# Patient Record
Sex: MALE | Race: WHITE | Employment: OTHER | URBAN - METROPOLITAN AREA
[De-identification: names, ages, dates, MRNs, and addresses within clinical notes are randomized per-mention and may not be internally consistent; named-entity substitution may affect disease eponyms.]

---

## 2019-08-06 LAB
CREATININE, EXTERNAL: 1.03
LDL-C, EXTERNAL: 134

## 2019-11-08 LAB — HBA1C MFR BLD HPLC: 6.5 %

## 2020-01-02 ENCOUNTER — OFFICE VISIT (OUTPATIENT)
Dept: FAMILY MEDICINE CLINIC | Age: 65
End: 2020-01-02

## 2020-01-02 VITALS
SYSTOLIC BLOOD PRESSURE: 138 MMHG | BODY MASS INDEX: 35.44 KG/M2 | HEIGHT: 76 IN | OXYGEN SATURATION: 97 % | HEART RATE: 94 BPM | RESPIRATION RATE: 16 BRPM | WEIGHT: 291 LBS | TEMPERATURE: 98.7 F | DIASTOLIC BLOOD PRESSURE: 86 MMHG

## 2020-01-02 DIAGNOSIS — E11.319 CONTROLLED TYPE 2 DIABETES MELLITUS WITH RETINOPATHY OF RIGHT EYE, WITH LONG-TERM CURRENT USE OF INSULIN, MACULAR EDEMA PRESENCE UNSPECIFIED, UNSPECIFIED RETINOPATHY SEVERITY (HCC): Primary | ICD-10-CM

## 2020-01-02 DIAGNOSIS — J45.20 MILD INTERMITTENT ASTHMA WITHOUT COMPLICATION: ICD-10-CM

## 2020-01-02 DIAGNOSIS — E66.01 SEVERE OBESITY (HCC): ICD-10-CM

## 2020-01-02 DIAGNOSIS — G31.84 MCI (MILD COGNITIVE IMPAIRMENT): ICD-10-CM

## 2020-01-02 DIAGNOSIS — N40.1 BENIGN PROSTATIC HYPERPLASIA WITH LOWER URINARY TRACT SYMPTOMS, SYMPTOM DETAILS UNSPECIFIED: ICD-10-CM

## 2020-01-02 DIAGNOSIS — N52.9 ERECTILE DYSFUNCTION, UNSPECIFIED ERECTILE DYSFUNCTION TYPE: ICD-10-CM

## 2020-01-02 DIAGNOSIS — E78.5 HYPERLIPIDEMIA, UNSPECIFIED HYPERLIPIDEMIA TYPE: ICD-10-CM

## 2020-01-02 DIAGNOSIS — Z79.4 CONTROLLED TYPE 2 DIABETES MELLITUS WITH RETINOPATHY OF RIGHT EYE, WITH LONG-TERM CURRENT USE OF INSULIN, MACULAR EDEMA PRESENCE UNSPECIFIED, UNSPECIFIED RETINOPATHY SEVERITY (HCC): Primary | ICD-10-CM

## 2020-01-02 DIAGNOSIS — E11.42 CONTROLLED TYPE 2 DIABETES MELLITUS WITH DIABETIC POLYNEUROPATHY, WITH LONG-TERM CURRENT USE OF INSULIN (HCC): ICD-10-CM

## 2020-01-02 DIAGNOSIS — Z79.4 CONTROLLED TYPE 2 DIABETES MELLITUS WITH DIABETIC POLYNEUROPATHY, WITH LONG-TERM CURRENT USE OF INSULIN (HCC): ICD-10-CM

## 2020-01-02 RX ORDER — ATORVASTATIN CALCIUM 10 MG/1
10 TABLET, FILM COATED ORAL DAILY
COMMUNITY
Start: 2019-12-09 | End: 2020-01-29 | Stop reason: DRUGHIGH

## 2020-01-02 RX ORDER — SILDENAFIL 100 MG/1
100 TABLET, FILM COATED ORAL AS NEEDED
COMMUNITY
End: 2020-01-02

## 2020-01-02 RX ORDER — ACYCLOVIR 400 MG/1
400 TABLET ORAL
COMMUNITY
Start: 2019-12-13

## 2020-01-02 RX ORDER — INSULIN ASPART 100 [IU]/ML
30 INJECTION, SOLUTION INTRAVENOUS; SUBCUTANEOUS
COMMUNITY
Start: 2019-12-19 | End: 2020-01-07 | Stop reason: CLARIF

## 2020-01-02 RX ORDER — FENOFIBRATE 145 MG/1
145 TABLET, COATED ORAL DAILY
COMMUNITY
Start: 2019-12-10 | End: 2020-01-07

## 2020-01-02 RX ORDER — TADALAFIL 10 MG/1
10 TABLET ORAL DAILY
COMMUNITY
End: 2020-09-16

## 2020-01-02 RX ORDER — ALBUTEROL SULFATE 90 UG/1
2 AEROSOL, METERED RESPIRATORY (INHALATION)
COMMUNITY
Start: 2019-12-06

## 2020-01-02 RX ORDER — INSULIN GLARGINE 100 [IU]/ML
90 INJECTION, SOLUTION SUBCUTANEOUS DAILY
COMMUNITY
Start: 2019-12-19 | End: 2020-01-07

## 2020-01-02 RX ORDER — TAMSULOSIN HYDROCHLORIDE 0.4 MG/1
0.4 CAPSULE ORAL DAILY
COMMUNITY
Start: 2019-12-09

## 2020-01-02 NOTE — PATIENT INSTRUCTIONS
Preventing Outdoor Falls: Care Instructions  Your Care Instructions    Worries about falls don't need to keep you indoors. Outdoor activities like walking have big benefits for your health. You will need to watch your step and learn a few safety measures. If you are worried about having a fall outdoors, ask your doctor about exercises, classes, or physical therapy that may help. You can learn ways to gain strength, flexibility, and balance. Ask if it might help to use a cane or walker. You can make your time outdoors safer with a few simple measures. Follow-up care is a key part of your treatment and safety. Be sure to make and go to all appointments, and call your doctor if you are having problems. It's also a good idea to know your test results and keep a list of the medicines you take. How can you prevent falls outdoors? · Wear shoes with firm soles and low heels. If you have to walk on an icy surface, use grippers that can be worn over your shoes in bad weather. · Be extra careful if weather is bad. Walk on the grass when the sidewalks are slick. If you live in a place that gets snow and ice in the winter, sprinkle salt on slippery stairs and sidewalks. · Be careful getting on or off buses and trains or getting in and out of cars. If handrails are available, use them. · Be careful when you cross the street. Look for crosswalks or places where curb cuts or ramps are present. · Try not to hurry, especially if you are carrying something. · Be cautious in parking lots or garages. There may be curbs or changes in pavement, or the height of the pavement may vary. · Make sure to wear the correct eyeglasses, if you need them. Reading glasses or bifocals can make it harder to see hazards that might be in your way. · If you are walking outdoors for exercise, try to:  ? Walk in well-lighted, well-maintained areas. These include high school or college tracks, shopping malls, and public spaces.   ? Walk with a partner. ? Watch out for cracked sidewalks, curbs, changes in the height of the pavement, exposed tree roots, and debris such as fallen leaves or branches. Where can you learn more? Go to http://abby-anastasiya.info/. Enter E192 in the search box to learn more about \"Preventing Outdoor Falls: Care Instructions. \"  Current as of: November 7, 2018  Content Version: 12.2  © 0299-1633 [employer]. Care instructions adapted under license by [employer] (which disclaims liability or warranty for this information). If you have questions about a medical condition or this instruction, always ask your healthcare professional. [place] [address] any warranty or liability for your use of this information.

## 2020-01-06 ENCOUNTER — TELEPHONE (OUTPATIENT)
Dept: INTERNAL MEDICINE CLINIC | Age: 65
End: 2020-01-06

## 2020-01-06 NOTE — TELEPHONE ENCOUNTER
Pharmacy Progress Note - Telephone Encounter    S/O: Mr. Rian Child 62 y.o. male, referred by Dr. John Paul Griffin MD, was contacted via an outbound telephone call to discuss his diabetes management today. Verified patients identifiers (name & ) per HIPAA policy. Medication access/SHx:  - Hx of working as an EMT  - Has [employer]. - Hx of receiving care at the [facility]    T2DM:  Endorses 20+ yr hx of T2DM. \"Was off of insulin for about 10 yrs w/ lifestyle modifications\" Resumed insulin therapy 6-7 yrs ago. Reports last A1c (~2019) was 7%. Endorses neuropathy, increased somnolence     Current regimen:  Lantus 90 units daily --- admits he misses this 3-4x/week - \"overslept\"  Novolog/Humalog - 30 units before lunch and dinner -- > sometimes would give 30 units before bedtime \"depending on how high my BG is\"    Monitors fasting and pre-HS SMBGs. Reports fasting readings are usually in 140-150s when he remembers to take Lantus. Otherwise, it would be in the low to mid 200s. Relies on SNAP for meal options. Endorses high carb intake when funds were limited. Wants assistance w/ finding a podiatrist that would be in network w/ his insurance. A/P:  - Pt is scheduled for tomorrow for diabetes management. Provided patient with my office & contact information.  - Recommend patient bring in all medications, previous lab results from [facility], glucometer/log book for review. - Patient endorses understanding to the provided information. All questions were answered at this time.      Thank you for the consult,  Eulalia Duke, PharmD, BCACP, CDE

## 2020-01-07 ENCOUNTER — OFFICE VISIT (OUTPATIENT)
Dept: INTERNAL MEDICINE CLINIC | Age: 65
End: 2020-01-07

## 2020-01-07 VITALS
WEIGHT: 289.4 LBS | HEART RATE: 84 BPM | SYSTOLIC BLOOD PRESSURE: 142 MMHG | BODY MASS INDEX: 35.24 KG/M2 | HEIGHT: 76 IN | OXYGEN SATURATION: 96 % | DIASTOLIC BLOOD PRESSURE: 87 MMHG

## 2020-01-07 DIAGNOSIS — E11.42 CONTROLLED TYPE 2 DIABETES MELLITUS WITH DIABETIC POLYNEUROPATHY, WITH LONG-TERM CURRENT USE OF INSULIN (HCC): Primary | ICD-10-CM

## 2020-01-07 DIAGNOSIS — Z71.89 ENCOUNTER FOR MEDICATION REVIEW AND COUNSELING: ICD-10-CM

## 2020-01-07 DIAGNOSIS — I10 ESSENTIAL HYPERTENSION: ICD-10-CM

## 2020-01-07 DIAGNOSIS — Z79.4 CONTROLLED TYPE 2 DIABETES MELLITUS WITH DIABETIC POLYNEUROPATHY, WITH LONG-TERM CURRENT USE OF INSULIN (HCC): Primary | ICD-10-CM

## 2020-01-07 RX ORDER — ACETAMINOPHEN, DIPHENHYDRAMINE HCL, PHENYLEPHRINE HCL 325; 25; 5 MG/1; MG/1; MG/1
10 TABLET ORAL
COMMUNITY
End: 2020-09-16

## 2020-01-07 RX ORDER — INSULIN LISPRO 100 [IU]/ML
30 INJECTION, SOLUTION INTRAVENOUS; SUBCUTANEOUS
COMMUNITY

## 2020-01-07 RX ORDER — LOSARTAN POTASSIUM 25 MG/1
25 TABLET ORAL DAILY
COMMUNITY

## 2020-01-07 RX ORDER — MONTELUKAST SODIUM 10 MG/1
10 TABLET ORAL DAILY
COMMUNITY

## 2020-01-07 RX ORDER — CYCLOBENZAPRINE HCL 10 MG
10 TABLET ORAL
COMMUNITY

## 2020-01-07 RX ORDER — INSULIN GLARGINE 100 [IU]/ML
45 INJECTION, SOLUTION SUBCUTANEOUS 2 TIMES DAILY
Qty: 30 ML | Refills: 1
Start: 2020-01-07

## 2020-01-07 RX ORDER — LORATADINE 10 MG/1
10 TABLET ORAL DAILY
COMMUNITY

## 2020-01-07 NOTE — PROGRESS NOTES
Pharmacy Progress Note - Diabetes Management    S/O: Mr. Mell Lim is a 62 y.o. male, referred by Dr. Saeid Lind MD, with a PMH of T2DM w/ retinopathy and polyneuropathy, Hx of CVA, CAD, HTN, HLD, BPH, Asthma, PRIYANK, was seen today for diabetes management and medication review. Reports most recent A1c was 7% (~Nov 2019). 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 Prior to that he was uninsured.      Diabetes Management:  Diabetes History:  - Endorses 20+ yr hx of T2DM dx     Current anti-hyperglycemic regimen includes:    - Lantus Solarstar 90 units (administers two 45 unit injections) at bedtime --- admits he misses this dose often  - Humalog 30 units before meals usually BID - TID     - On ASA: NO  - On a moderate/high intensity statin: YES -  lipitor 10 mg  - On ACE/ARB therapy: YES - losartan 25 mg - has been off for about 1 week - \"it's too expensive for me\" ; will  med at [address] today ;   - Nicotine dependence?:  No    ROS:  Today, Pt endorses:  - Symptoms of Hyperglycemia: fatigue and weakness  - Symptoms of Hypoglycemia: none  ---> reports usually symptomatic when BG < 110    Self Monitoring Blood Glucose (SMBG) or CGM:  - Brought inblood glucose log today:  yes     Date Before Breakfast Before Lunch Before Dinner Bedtime Notes   12/31/2019 296   155 Missed Lantus   1/1/2020 207   158    1/2/2020 145   211    1/3/2020 172  102 174 Missed Lantus   1/4/2020  155 171     1/5/2020 153   239    1/6/2020 152  285  Missed Lantus 1/7/2020 195       Avg 189 155 186 187      Nutrition:  - Knows how to read a nutrition label - watches carbohydrate & fiber content   - Reports to eating 2-3 meals daily ; usually brunch and dinner  - Breakfast: usually 2-3 cups of coffee with milk or 2-3 eggs and sausage and 1 slice of toast  - Dinner last night:   - Snacks: - \"would eat an entire sleeve of crackers in one sitting\" - trying to limit my snack craving  - Had 1 ice cream sandwich before bedtime   - Alcohol consumption? Yes - usually socially - had 2 beers on Saturday - understands effect of alcohol on BG    Physical Activity:   - Wants to get back to going to the gym ; motivation is to be on less insulin and meds   - \"I do better when I exercise around other people\"   - Hx of lifting weights     Vitals: Wt Readings from Last 3 Encounters:   01/07/20 289 lb 6.4 oz (131.3 kg)   01/02/20 291 lb (132 kg)     BP Readings from Last 3 Encounters:   01/07/20 142/87   01/02/20 138/86     Pulse Readings from Last 3 Encounters:   01/07/20 84   01/02/20 94       Past Medical History:   Diagnosis Date    Back pain     Diabetes (HCC)     Type 2    DM neuropathy, type II diabetes mellitus (HCC)      Allergies   Allergen Reactions    Sulfa (Sulfonamide Antibiotics) Hives       Current Outpatient Medications   Medication Sig    atorvastatin (LIPITOR) 10 mg tablet Take 10 mg by mouth daily.  fenofibrate nanocrystallized (TRICOR) 145 mg tablet Take 145 mg by mouth daily.  albuterol (PROVENTIL HFA, VENTOLIN HFA, PROAIR HFA) 90 mcg/actuation inhaler     acyclovir (ZOVIRAX) 400 mg tablet Take 400 mg by mouth two (2) times daily as needed.  NOVOLOG FLEXPEN U-100 INSULIN 100 unit/mL (3 mL) inpn INJECT 20 UNITS SUBCUTANEOUSLY WITH 3 MEALS AND 10 UNITS WITH 4 SNACKS DAILY    LANTUS SOLOSTAR U-100 INSULIN 100 unit/mL (3 mL) inpn 90 Units by SubCUTAneous route daily.  tamsulosin (FLOMAX) 0.4 mg capsule Take 0.4 mg by mouth daily.     tadalafil (CIALIS) 10 mg tablet Take 10 mg by mouth daily.  LOSARTAN PO Take  by mouth. No current facility-administered medications for this visit. No labs available at this time. A/P:    Medication Adherence/Access:  - Pt is not adherent to current medication regimen. - See updated med list below. Diabetes Management:  - Still waiting for his external lab results. - BP elevated for goal of < 130/80 today. Recommend patient  losartan as soon as possible. Based on his insurance coverage, this should be free. - Based on provided SMBG, fasting and post prandial readings are elevated for goal < 130 and < 180.    - Discussed concern for his frequent Lantus non-adherence. Recommend distributing administration to 45 units in the morning and 45 units before bedtime  - Continue Humalog 30 units before meals BID-TID.    - Continue to monitor fasting, pre meal and pre-HS SMBG. Bring log to future visits    Nutrition/Lifestyle Modifications:  - Pt has a fairly good understand of lifestyle modifications. - Continue to moderate simple carb intake     Resources provided:  - Thee in network providers for [facility] and podiatry  - [facility] low income membership application - the closest one to his is in [address] and Medication Adherence at the next visit. Medication reconciliation was completed during the visit. Medications Discontinued During This Encounter   Medication Reason    LOSARTAN PO Duplicate Order    fenofibrate nanocrystallized (TRICOR) 145 mg tablet Not A Current Medication    NOVOLOG FLEXPEN U-100 INSULIN 100 unit/mL (3 mL) inpn Formulary Change    LANTUS SOLOSTAR U-100 INSULIN 100 unit/mL (3 mL) inpn        Patient verbalized understanding of the information presented and all of the patients questions were answered. AVS was handed to the patient. Patient advised to call the office with any additional questions or concerns.     Notifications of recommendations will be sent to Dr. Tanna Cooley MD for review. Current updated med list:    Current Outpatient Medications   Medication Sig    cyclobenzaprine (FLEXERIL) 10 mg tablet Take 10 mg by mouth nightly as needed for Muscle Spasm(s). Indications: muscle spasm    loratadine (CLARITIN) 10 mg tablet Take 10 mg by mouth daily.  montelukast (SINGULAIR) 10 mg tablet Take 10 mg by mouth daily.  melatonin 10 mg tab Take 10 mg by mouth nightly.  insulin lispro (HUMALOG U-100 INSULIN) 100 unit/mL injection 30 Units by SubCUTAneous route Before breakfast, lunch, and dinner. Indications: type 2 diabetes mellitus    LANTUS SOLOSTAR U-100 INSULIN 100 unit/mL (3 mL) inpn 45 Units by SubCUTAneous route two (2) times a day.  atorvastatin (LIPITOR) 10 mg tablet Take 10 mg by mouth daily.  acyclovir (ZOVIRAX) 400 mg tablet Take 400 mg by mouth two (2) times daily as needed.  tamsulosin (FLOMAX) 0.4 mg capsule Take 0.4 mg by mouth daily.  tadalafil (CIALIS) 10 mg tablet Take 10 mg by mouth daily.  losartan (COZAAR) 25 mg tablet Take 25 mg by mouth daily.  albuterol (PROVENTIL HFA, VENTOLIN HFA, PROAIR HFA) 90 mcg/actuation inhaler Take 2 Puffs by inhalation every six (6) hours as needed for Shortness of Breath. No current facility-administered medications for this visit. Patient will return to clinic in 3 week(s) for follow up.      Thank you for the consult,  Eulalia Marie, PharmD, BCACP, CDE

## 2020-01-07 NOTE — PATIENT INSTRUCTIONS
· Divide Lantus dose to 45 units in the morning and 45 units before bedtime. · Continue Humalog 30 units before each meal two to three times daily. · Bring in all of your medication vials. Your blood sugar goals:  - Fasting (first thing in the morning)  blood sugar: 80 - 130   - 1 to 2 hours after a meal: less than 180   Bring your log book to future visits. When you experience symptoms of low blood sugar (example: less than 70):  - Confirm low reading by checking your blood sugar.   - Then treat with 15 grams of carbohydrates (one-half cup of juice or regular soda, or 4-5 glucose tablets).   - Wait 15 minutes to recheck blood sugar.   - Then eat a protein containing meal/snack to prevent another low blood sugar episode. (example: peanut butter + crackers)    CA: [address]

## 2020-01-29 ENCOUNTER — OFFICE VISIT (OUTPATIENT)
Dept: INTERNAL MEDICINE CLINIC | Age: 65
End: 2020-01-29

## 2020-01-29 VITALS
WEIGHT: 288 LBS | SYSTOLIC BLOOD PRESSURE: 144 MMHG | HEART RATE: 99 BPM | DIASTOLIC BLOOD PRESSURE: 92 MMHG | BODY MASS INDEX: 35.07 KG/M2 | HEIGHT: 76 IN | OXYGEN SATURATION: 97 %

## 2020-01-29 DIAGNOSIS — E11.42 CONTROLLED TYPE 2 DIABETES MELLITUS WITH DIABETIC POLYNEUROPATHY, WITH LONG-TERM CURRENT USE OF INSULIN (HCC): Primary | ICD-10-CM

## 2020-01-29 DIAGNOSIS — Z71.89 ENCOUNTER FOR MEDICATION REVIEW AND COUNSELING: ICD-10-CM

## 2020-01-29 DIAGNOSIS — Z79.4 CONTROLLED TYPE 2 DIABETES MELLITUS WITH DIABETIC POLYNEUROPATHY, WITH LONG-TERM CURRENT USE OF INSULIN (HCC): Primary | ICD-10-CM

## 2020-01-29 RX ORDER — ATORVASTATIN CALCIUM 40 MG/1
40 TABLET, FILM COATED ORAL DAILY
COMMUNITY
Start: 2020-01-27

## 2020-01-29 RX ORDER — OMEPRAZOLE 20 MG/1
20 CAPSULE, DELAYED RELEASE ORAL DAILY
COMMUNITY

## 2020-01-29 RX ORDER — FENOFIBRATE 145 MG/1
145 TABLET, COATED ORAL DAILY
COMMUNITY
Start: 2020-01-27 | End: 2020-09-16

## 2020-01-29 RX ORDER — ASPIRIN 81 MG/1
81 TABLET ORAL DAILY
COMMUNITY

## 2020-01-29 NOTE — PATIENT INSTRUCTIONS
· Make sure you are taking Lantus 45 units twice daily. · Continue with Humalog 30 units three times a day before your meals. If blood sugar pre-meal is above 200, give 34 units. · Bring any lab results you have to your next appointments.

## 2020-01-29 NOTE — PROGRESS NOTES
Pharmacy Progress Note - Diabetes Management S/O: Mr. Mell Lim is a 62 y.o. male, referred by Dr. Saeid Lind MD, with a PMH of T2DM w/ retinopathy and polyneuropathy, Hx of CVA, CAD, HTN, HLD, BPH, Asthma, PRIYANK, was seen today for diabetes management and medication review. Reports most recent A1c was 6.5% (11/8/19). Interim update: *** SHx: 
- Medication Adherence/Access: 
- Brought in home medications including prescription/non-prescriptions:  {yes no:03734} - Endorses barriers to affording/accessing medications : {yes no:58084} - Uses a pill box/other methods to organize medications: {yes no:94386} - Endorses adherence to current regimen?: {yes no:27029} - Uses *** pharmacy ; Rx insurance is: *** Diabetes Management: 
Diabetes History: 
- Endorses - Family hx:  
- Previous anti-hyperglycemic agents includes: 
 
Current anti-hyperglycemic regimen includes:   
- Lantus Solostar 45 units every morning and 45 units at bedtime - Humalog 30 units before meals BID - TID 
 
- On ASA: NO 
- On a moderate/high intensity statin: YES - Lipitor 10 mg 
- On ACE/ARB therapy: YES - losartan 25 mg  
- Nicotine dependence?:  No 
 
ROS: 
Today, Pt endorses: 
- {Symptoms of Hyperglycemia:68047:::1} 
- {Symptoms of Hypoglycemia:81111:::1} Self Monitoring Blood Glucose (SMBG) or CGM: 
- Brought in home glucometer/blood glucose log/CGM reader today:  {yes no:07711} Date Before Breakfast Before Lunch Before Dinner Bedtime Notes 1/8/2020        
1/9/2020        
1/10/2020        
1/11/2020        
1/12/2020       
1/13/2020       
1/14/2020       
1/15/2020        
1/16/2020       
1/17/2020       
1/18/2020       
1/19/2020       
1/20/2020       
1/21/2020       
1/22/2020       
1/23/2020       
1/24/2020       
1/25/2020       
1/26/2020       
1/27/2020       
1/28/2020       
1/29/2020 Avg Nutrition: 
- Eats {Numbers; 1-4 :70433397} meals/day - Breakfast: 
- Lunch: 
 - Dinner: - Snack(s):  
- Beverage(s): 
- Alcohol consumption? {YES (DEF) - NO:50992::\"Yes\"} Physical Activity:  
{yes no:99957} 
- Consists of  
 
 
Vitals: Wt Readings from Last 3 Encounters:  
01/07/20 289 lb 6.4 oz (131.3 kg) 01/02/20 291 lb (132 kg) BP Readings from Last 3 Encounters:  
01/07/20 142/87  
01/02/20 138/86 Pulse Readings from Last 3 Encounters:  
01/07/20 84  
01/02/20 94 Past Medical History:  
Diagnosis Date  Back pain  Diabetes ([facility] Type 2  
 DM neuropathy, type II diabetes mellitus ([facility] [address] Allergies Allergen Reactions  Sulfa (Sulfonamide Antibiotics) Hives Current Outpatient Medications Medication Sig  
 losartan (COZAAR) 25 mg tablet Take 25 mg by mouth daily.  cyclobenzaprine (FLEXERIL) 10 mg tablet Take 10 mg by mouth nightly as needed for Muscle Spasm(s). Indications: muscle spasm  loratadine (CLARITIN) 10 mg tablet Take 10 mg by mouth daily.  montelukast (SINGULAIR) 10 mg tablet Take 10 mg by mouth daily.  melatonin 10 mg tab Take 10 mg by mouth nightly.  insulin lispro (HUMALOG U-100 INSULIN) 100 unit/mL injection 30 Units by SubCUTAneous route Before breakfast, lunch, and dinner. Indications: type 2 diabetes mellitus  LANTUS SOLOSTAR U-100 INSULIN 100 unit/mL (3 mL) inpn 45 Units by SubCUTAneous route two (2) times a day.  atorvastatin (LIPITOR) 10 mg tablet Take 10 mg by mouth daily.  albuterol (PROVENTIL HFA, VENTOLIN HFA, PROAIR HFA) 90 mcg/actuation inhaler Take 2 Puffs by inhalation every six (6) hours as needed for Shortness of Breath.  acyclovir (ZOVIRAX) 400 mg tablet Take 400 mg by mouth two (2) times daily as needed.  tamsulosin (FLOMAX) 0.4 mg capsule Take 0.4 mg by mouth daily.  tadalafil (CIALIS) 10 mg tablet Take 10 mg by mouth daily. No current facility-administered medications for this visit.    
 
 
No results found for: NA, K, CL, CO2, AGAP, GLU, BUN, CREA, BUCR, GFRAA, GFRNA, CA, TBIL, TBILI, GPT, SGOT, AP, TP, ALB, GLOB, AGRAT, ALT No results found for: CHOL, CHOLPOCT, CHOLX, CHLST, CHOLV, HDL, HDLPOC, HDLP, LDL, LDLCPOC, LDLC, DLDLP, VLDLC, VLDL, TGLX, TRIGL, TRIGP, TGLPOCT, CHHD, CHHDX No results found for: WBC, WBCLT, HGBPOC, HGB, HGBP, HCTPOC, HCT, PHCT, RBCH, PLT, MCV, HGBEXT, HCTEXT, PLTEXT No results found for: Cleta Held, MCA2, [address], MCAU, [address], MCALPOCT HbA1c: 
Lab Results Component Value Date/Time Hemoglobin A1c, External 6.5 11/08/2019 No components found for: 2 Last Point of Care HGB A1C No results found for: LPF2CQLZ CrCl cannot be calculated (No successful lab value found. ). A/P: 
 
Medication Adherence/Access: 
- Pt {IS/IS NOT:69395} adherent to current medication regimen. Diabetes Management: - Per ADA guidelines, Pt's A1c {IS/IS NOT:63402} at goal of < ***. 
- Current SMBG(s)/CGM trend - Recommend checking/scanning - Reviewed and provided resource discussing s/sx of hypoglycemia and management - Bring glucometer/log/ CGM reader to all future visits Established patient-centered goal(s) to follow up on at the next visit:   
 
Resources provided: 
- Check: {Plan-TT:44021} at the next visit. Medication reconciliation was completed during the visit. There are no discontinued medications. Patient verbalized understanding of the information presented and all of the patients questions were answered. AVS was handed to the patient. Patient advised to call the office with any additional questions or concerns. Notifications of recommendations will be sent to Dr. Triny White MD for review. Patient will return to clinic in {numbers 1-12:83278} week(s) for follow up. Thank you for the consult, London Sanchez, PharmD,

## 2020-01-29 NOTE — PROGRESS NOTES
Pharmacy Progress Note - Diabetes Management    S/O: Mr. Francisco Javier Quevedo is a 62 y.o. male, referred by Dr. Elham Benavides MD, with a PMH of T2DM w/ retinopathy and polyneuropathy, Hx of CVA, CAD, HTN, HLD, BPH, Asthma, PRIYANK, was seen today for diabetes management and medication review. External records shows last A1c was 6.5% (2019). Interim update: \"Feeling better\"   - Saw an Renetta Hall NP at the [facility] on 20 for routine follow up. Had repeat A1c, CMP, and lipid panel collected that day. No results yet. Visit summary report - med list show fenofibrate 145 mg daily, atorvastatin increased from 10 mg to 40 mg daily, ASA 81 mg daily, and omeprazole 20 mg daily.   - Reports he will be seeing a vascular surgeon at [facility] tomorrow to evaluate his foot pain. Current anti-hyperglycemic regimen include(s):    - Lantus Solarstar 45 units in the AM and 45 units HS  ---> admits to missing AM dose of Lantus - he's in the midst of moving to [facility]. Within the past 2 weeks, he's only given Lantus 8 times - all PM dose.    - Humalog 30 units BID-TID before meals     - On ASA: NO  - On a moderate/high intensity statin: YES - Lipitor; last HDL: 42 ; LDL: 134; T (Aug 2019)  - On ACE/ARB therapy: YES - losartan 25 mg   - Nicotine dependence?:  No    ROS:  Today, Pt endorses:  - Symptoms of Hyperglycemia: none  - Symptoms of Hypoglycemia: none    Self Monitoring Blood Glucose (SMBG) or CGM:  - Brought in home glucometer/blood glucose log today:  yes  Before Breakfast Before Lunch Before Dinner Bedtime   216 275 191    202 179 189    184 148 231     128 111    209 108     199   231   200 148  148   243 161  200   198       225  269    281     218      227         Nutrition/Lifestyle Modifications:  - B: 3 eggs and sausage, coffee, with stevia  - D: salad & cheese and moncada bits & blue cheese dressing  - Continues to snack on cheese and crackers throughout the day    - Physical Activity:   [employer] plan remains pending  Still very sedentary       Vitals: Wt Readings from Last 3 Encounters:   01/29/20 288 lb (130.6 kg)   01/07/20 289 lb 6.4 oz (131.3 kg)   01/02/20 291 lb (132 kg)     BP Readings from Last 3 Encounters:   01/29/20 (!) 144/92   01/07/20 142/87   01/02/20 138/86     Pulse Readings from Last 3 Encounters:   01/29/20 99   01/07/20 84   01/02/20 94       Past Medical History:   Diagnosis Date    Back pain     Diabetes (HCC)     Type 2    DM neuropathy, type II diabetes mellitus (HCC)      Allergies   Allergen Reactions    Sulfa (Sulfonamide Antibiotics) Hives       Current Outpatient Medications   Medication Sig    losartan (COZAAR) 25 mg tablet Take 25 mg by mouth daily.  cyclobenzaprine (FLEXERIL) 10 mg tablet Take 10 mg by mouth nightly as needed for Muscle Spasm(s). Indications: muscle spasm    loratadine (CLARITIN) 10 mg tablet Take 10 mg by mouth daily.  montelukast (SINGULAIR) 10 mg tablet Take 10 mg by mouth daily.  melatonin 10 mg tab Take 10 mg by mouth nightly.  insulin lispro (HUMALOG U-100 INSULIN) 100 unit/mL injection 30 Units by SubCUTAneous route Before breakfast, lunch, and dinner. Indications: type 2 diabetes mellitus    LANTUS SOLOSTAR U-100 INSULIN 100 unit/mL (3 mL) inpn 45 Units by SubCUTAneous route two (2) times a day.  atorvastatin (LIPITOR) 10 mg tablet Take 10 mg by mouth daily.  albuterol (PROVENTIL HFA, VENTOLIN HFA, PROAIR HFA) 90 mcg/actuation inhaler Take 2 Puffs by inhalation every six (6) hours as needed for Shortness of Breath.  acyclovir (ZOVIRAX) 400 mg tablet Take 400 mg by mouth two (2) times daily as needed.  tamsulosin (FLOMAX) 0.4 mg capsule Take 0.4 mg by mouth daily.  tadalafil (CIALIS) 10 mg tablet Take 10 mg by mouth daily. No current facility-administered medications for this visit.         HbA1c:  Lab Results   Component Value Date/Time    Hemoglobin A1c, External 6.5 11/08/2019       A/P:    Diabetes Management:  - Per ADA guidelines, Pt's A1c is at goal of < 7%. BP elevated today. - Current SMBGs remain elevated for fasting and post prandial goals secondary to his med non-compliance. - Emphasized importance of moderating simple carb intake again.   - Continue Lantus 45 units twice daily  - Continue Novolog 30 units TID. If pre-meal blood sugar > 200, give 34 units. Check: Vitals, Weight and Medication Adherence at the next visit. Medication reconciliation was completed during the visit. Medications Discontinued During This Encounter   Medication Reason    atorvastatin (LIPITOR) 10 mg tablet Dose Adjustment     Patient verbalized understanding of the information presented and all of the patients questions were answered. AVS was handed to the patient. Patient advised to call the office with any additional questions or concerns. Notifications of recommendations will be sent to Dr. Favio Del Rosario MD for review. Patient will return to clinic in 2 week(s) for follow up.      Thank you for the consult,  Eulalia Carranza, PharmD, BCACP, CDE

## 2020-02-11 ENCOUNTER — TELEPHONE (OUTPATIENT)
Dept: FAMILY MEDICINE CLINIC | Age: 65
End: 2020-02-11

## 2020-02-11 NOTE — TELEPHONE ENCOUNTER
----- Message from Marek Flaherty sent at 2/11/2020  2:05 PM EST -----  Regarding: Dr. Paulo Wing  Pt request for a referral for a C-Pap machine to be sent to his [place] at [phone]. Best contact number is [phone].

## 2020-03-11 ENCOUNTER — TELEPHONE (OUTPATIENT)
Dept: NEUROLOGY | Age: 65
End: 2020-03-11

## 2020-03-11 NOTE — TELEPHONE ENCOUNTER
Spoke to patient and told him what is needed, per patient will followup with a letter to his home with the same info.

## 2020-03-11 NOTE — TELEPHONE ENCOUNTER
----- Message from Joni Grey sent at 3/11/2020 12:51 PM EDT -----  Regarding: Dr. Patricia Arias  Pt is sick and would like a call back to r/s his appt.  Contact is [phone]

## 2020-03-12 NOTE — TELEPHONE ENCOUNTER
Called pt back and he said that he would call and schedule later after Covid dies down more. Gave him my direct # for call back.

## 2020-09-16 ENCOUNTER — VIRTUAL VISIT (OUTPATIENT)
Dept: FAMILY MEDICINE CLINIC | Age: 65
End: 2020-09-16
Payer: COMMERCIAL

## 2020-09-16 ENCOUNTER — TELEPHONE (OUTPATIENT)
Dept: FAMILY MEDICINE CLINIC | Age: 65
End: 2020-09-16

## 2020-09-16 DIAGNOSIS — I25.2 HISTORY OF MI (MYOCARDIAL INFARCTION): ICD-10-CM

## 2020-09-16 DIAGNOSIS — E11.42 CONTROLLED TYPE 2 DIABETES MELLITUS WITH DIABETIC POLYNEUROPATHY, WITH LONG-TERM CURRENT USE OF INSULIN (HCC): ICD-10-CM

## 2020-09-16 DIAGNOSIS — E66.01 SEVERE OBESITY (HCC): ICD-10-CM

## 2020-09-16 DIAGNOSIS — I10 ESSENTIAL HYPERTENSION: ICD-10-CM

## 2020-09-16 DIAGNOSIS — E78.5 HYPERLIPIDEMIA, UNSPECIFIED HYPERLIPIDEMIA TYPE: ICD-10-CM

## 2020-09-16 DIAGNOSIS — Z12.5 PROSTATE CANCER SCREENING: ICD-10-CM

## 2020-09-16 DIAGNOSIS — Z79.4 CONTROLLED TYPE 2 DIABETES MELLITUS WITH DIABETIC POLYNEUROPATHY, WITH LONG-TERM CURRENT USE OF INSULIN (HCC): ICD-10-CM

## 2020-09-16 DIAGNOSIS — G47.33 OSA ON CPAP: Primary | ICD-10-CM

## 2020-09-16 DIAGNOSIS — Z99.89 OSA ON CPAP: Primary | ICD-10-CM

## 2020-09-16 DIAGNOSIS — Z86.73 HISTORY OF CVA (CEREBROVASCULAR ACCIDENT): ICD-10-CM

## 2020-09-16 PROCEDURE — 99443 PR PHYS/QHP TELEPHONE EVALUATION 21-30 MIN: CPT | Performed by: FAMILY MEDICINE

## 2020-09-16 RX ORDER — EPINEPHRINE 0.3 MG/.3ML
INJECTION SUBCUTANEOUS
COMMUNITY
Start: 2020-09-10

## 2020-09-16 RX ORDER — INSULIN ASPART 100 [IU]/ML
INJECTION, SOLUTION INTRAVENOUS; SUBCUTANEOUS
COMMUNITY
Start: 2020-06-17

## 2020-09-16 RX ORDER — SILDENAFIL 100 MG/1
100 TABLET, FILM COATED ORAL AS NEEDED
COMMUNITY

## 2020-09-16 RX ORDER — HYDROCORTISONE 25 MG/G
OINTMENT TOPICAL
COMMUNITY
Start: 2020-07-29

## 2020-09-16 NOTE — PROGRESS NOTES
Skye De La Paz is a 60 y.o. male, evaluated via audio-only technology on 9/16/2020 for CPAP  . Assessment & Plan:   Diagnoses and all orders for this visit:    1. PRIYANK on CPAP  -     AMB SUPPLY ORDER    2. Controlled type 2 diabetes mellitus with diabetic polyneuropathy, with long-term current use of insulin (HCC)  -     URINALYSIS W/ RFLX MICROSCOPIC; Future  -     TSH 3RD GENERATION; Future  -     METABOLIC PANEL, COMPREHENSIVE; Future  -     LIPID PANEL; Future  -     HEMOGLOBIN A1C WITH EAG; Future  -     MICROALBUMIN, UR, RAND W/ MICROALB/CREAT RATIO; Future    3. Severe obesity ([facility] [address]  -     TSH 3RD GENERATION; Future  -     METABOLIC PANEL, COMPREHENSIVE; Future  -     LIPID PANEL; Future    4. Hyperlipidemia, unspecified hyperlipidemia type  -     LIPID PANEL; Future    5. Essential hypertension  -     CBC WITH AUTOMATED DIFF; Future  -     URINALYSIS W/ RFLX MICROSCOPIC; Future  -     TSH 3RD GENERATION; Future  -     METABOLIC PANEL, COMPREHENSIVE; Future  -     LIPID PANEL; Future    6. Prostate cancer screening  -     PSA, DIAGNOSTIC (PROSTATE SPECIFIC AG); Future    7. History of MI (myocardial infarction)    8. History of CVA (cerebrovascular accident)      The complexity of medical decision making for this visit is moderate         12  Subjective: On CPAP for long time. Needs back up machine as previous machine failed Feb 2019 and had stroke and MI. Not yet seen Dr. Sean Echols for memory issues. Moved to [place]. Recent poison ivy outbreak. Not yet made appt with endo, urol. Feels ringing in head. Different than usual tinnitus. Fatigued. Insomnia. Prior to Admission medications    Medication Sig Start Date End Date Taking? Authorizing Provider   fenofibrate nanocrystallized (TRICOR) 145 mg tablet Take 145 mg by mouth daily. 1/27/20   Provider, Historical   atorvastatin (LIPITOR) 40 mg tablet Take 40 mg by mouth daily.  1/27/20   Provider, Historical   aspirin delayed-release 81 mg tablet Take 81 mg by mouth daily. Provider, Historical   omeprazole (PRILOSEC) 20 mg capsule Take 20 mg by mouth daily. Provider, Historical   losartan (COZAAR) 25 mg tablet Take 25 mg by mouth daily. Provider, Historical   cyclobenzaprine (FLEXERIL) 10 mg tablet Take 10 mg by mouth nightly as needed for Muscle Spasm(s). Indications: muscle spasm    Provider, Historical   loratadine (CLARITIN) 10 mg tablet Take 10 mg by mouth daily. Provider, Historical   montelukast (SINGULAIR) 10 mg tablet Take 10 mg by mouth daily. Provider, Historical   melatonin 10 mg tab Take 10 mg by mouth nightly. Provider, Historical   insulin lispro (HUMALOG U-100 INSULIN) 100 unit/mL injection 30 Units by SubCUTAneous route Before breakfast, lunch, and dinner. Indications: type 2 diabetes mellitus    Provider, Historical   LANTUS SOLOSTAR U-100 INSULIN 100 unit/mL (3 mL) inpn 45 Units by SubCUTAneous route two (2) times a day. 1/7/20   Virgil Sims MD   albuterol (PROVENTIL HFA, VENTOLIN HFA, PROAIR HFA) 90 mcg/actuation inhaler Take 2 Puffs by inhalation every six (6) hours as needed for Shortness of Breath. 12/6/19   Provider, Historical   acyclovir (ZOVIRAX) 400 mg tablet Take 400 mg by mouth two (2) times daily as needed. 12/13/19   Provider, Historical   tamsulosin (FLOMAX) 0.4 mg capsule Take 0.4 mg by mouth daily. 12/9/19   Provider, Historical   tadalafil (CIALIS) 10 mg tablet Take 10 mg by mouth daily.     Provider, Historical     Patient Active Problem List   Diagnosis Code    Severe obesity ([facility] [address] E66.01    Hyperlipidemia E78.5    Erectile dysfunction N52.9    Benign prostatic hyperplasia with lower urinary tract symptoms N40.1    Mild intermittent asthma without complication A29.97    Controlled type 2 diabetes mellitus with diabetic polyneuropathy, with long-term current use of insulin ([facility]) E11.42, Z79.4    Controlled type 2 diabetes mellitus with retinopathy of right eye, with long-term current use of insulin ([facility]) E11.319, Z79.4     Current Outpatient Medications   Medication Sig Dispense Refill    fenofibrate nanocrystallized (TRICOR) 145 mg tablet Take 145 mg by mouth daily.  atorvastatin (LIPITOR) 40 mg tablet Take 40 mg by mouth daily.  aspirin delayed-release 81 mg tablet Take 81 mg by mouth daily.  omeprazole (PRILOSEC) 20 mg capsule Take 20 mg by mouth daily.  losartan (COZAAR) 25 mg tablet Take 25 mg by mouth daily.  cyclobenzaprine (FLEXERIL) 10 mg tablet Take 10 mg by mouth nightly as needed for Muscle Spasm(s). Indications: muscle spasm      loratadine (CLARITIN) 10 mg tablet Take 10 mg by mouth daily.  montelukast (SINGULAIR) 10 mg tablet Take 10 mg by mouth daily.  melatonin 10 mg tab Take 10 mg by mouth nightly.  insulin lispro (HUMALOG U-100 INSULIN) 100 unit/mL injection 30 Units by SubCUTAneous route Before breakfast, lunch, and dinner. Indications: type 2 diabetes mellitus      LANTUS SOLOSTAR U-100 INSULIN 100 unit/mL (3 mL) inpn 45 Units by SubCUTAneous route two (2) times a day. 30 mL 1    albuterol (PROVENTIL HFA, VENTOLIN HFA, PROAIR HFA) 90 mcg/actuation inhaler Take 2 Puffs by inhalation every six (6) hours as needed for Shortness of Breath.  acyclovir (ZOVIRAX) 400 mg tablet Take 400 mg by mouth two (2) times daily as needed.  tamsulosin (FLOMAX) 0.4 mg capsule Take 0.4 mg by mouth daily.  tadalafil (CIALIS) 10 mg tablet Take 10 mg by mouth daily. Allergies   Allergen Reactions    Sulfa (Sulfonamide Antibiotics) Hives     Past Medical History:   Diagnosis Date    Back pain     Diabetes ([address]     Type 2    DM neuropathy, type II diabetes mellitus ([facility] [address]      No past surgical history on file.   Family History   Problem Relation Age of Onset    No Known Problems Mother     No Known Problems Father      Social History     Tobacco Use    Smoking status: Never Smoker    Smokeless tobacco: Never Used   Substance Use Topics  Alcohol use: Never     Frequency: Never       ROS    No flowsheet data found. Rafael Shannon, who was evaluated through a patient-initiated, synchronous (real-time) audio only encounter, and/or her healthcare decision maker, is aware that it is a billable service, with coverage as determined by his insurance carrier. He provided verbal consent to proceed: Yes. He has not had a related appointment within my department in the past 7 days or scheduled within the next 24 hours.       Total Time: minutes: 21-30 minutes    Kiet Stoner MD

## 2020-09-16 NOTE — TELEPHONE ENCOUNTER
Patient called in stating that he had a virtual visit with Dr. Mj Fletcher earlier this am and was requesting a CPAP machine. He's able to get one through his insurance with [address] On the prescription they would need to know how many cm of water. He also stated that he needs to complete kit that comes with it including the mask ( previous mask that he had is a [employer] Air sense 10. He also stated that they would need a sleep study report, he mentioned he haven't had one done in over 15, 20 years and believed to have had it done on [facility]. Writer attempted to reach the office several times with no success. He doesn't want to do another sleep study because he already done so and had the surgery.      [facility] Fax: [phone] ( for any assistance) : [phone] option 2    Sleep study office on [address]: [phone]

## 2020-09-22 ENCOUNTER — VIRTUAL VISIT (OUTPATIENT)
Dept: FAMILY MEDICINE CLINIC | Age: 65
End: 2020-09-22
Payer: COMMERCIAL

## 2020-09-22 ENCOUNTER — TELEPHONE (OUTPATIENT)
Dept: FAMILY MEDICINE CLINIC | Age: 65
End: 2020-09-22

## 2020-09-22 DIAGNOSIS — S06.0X0A CONCUSSION WITHOUT LOSS OF CONSCIOUSNESS, INITIAL ENCOUNTER: ICD-10-CM

## 2020-09-22 DIAGNOSIS — Z99.89 OSA ON CPAP: ICD-10-CM

## 2020-09-22 DIAGNOSIS — I10 ESSENTIAL HYPERTENSION: ICD-10-CM

## 2020-09-22 DIAGNOSIS — F51.04 CHRONIC INSOMNIA: ICD-10-CM

## 2020-09-22 DIAGNOSIS — E08.49 OTHER DIABETIC NEUROLOGICAL COMPLICATION ASSOCIATED WITH DIABETES MELLITUS DUE TO UNDERLYING CONDITION (HCC): ICD-10-CM

## 2020-09-22 DIAGNOSIS — N52.9 ERECTILE DYSFUNCTION, UNSPECIFIED ERECTILE DYSFUNCTION TYPE: ICD-10-CM

## 2020-09-22 DIAGNOSIS — E11.319 DIABETIC RETINOPATHY OF RIGHT EYE ASSOCIATED WITH TYPE 2 DIABETES MELLITUS, MACULAR EDEMA PRESENCE UNSPECIFIED, UNSPECIFIED RETINOPATHY SEVERITY (HCC): ICD-10-CM

## 2020-09-22 DIAGNOSIS — R29.90 STROKE-LIKE SYMPTOMS: ICD-10-CM

## 2020-09-22 DIAGNOSIS — N40.1 BENIGN PROSTATIC HYPERPLASIA WITH LOWER URINARY TRACT SYMPTOMS, SYMPTOM DETAILS UNSPECIFIED: ICD-10-CM

## 2020-09-22 DIAGNOSIS — E66.01 SEVERE OBESITY (HCC): ICD-10-CM

## 2020-09-22 DIAGNOSIS — G47.33 OSA ON CPAP: ICD-10-CM

## 2020-09-22 DIAGNOSIS — E78.5 HYPERLIPIDEMIA, UNSPECIFIED HYPERLIPIDEMIA TYPE: ICD-10-CM

## 2020-09-22 DIAGNOSIS — F33.41 RECURRENT MAJOR DEPRESSIVE DISORDER, IN PARTIAL REMISSION (HCC): ICD-10-CM

## 2020-09-22 DIAGNOSIS — F43.22 ADJUSTMENT DISORDER WITH ANXIOUS MOOD: ICD-10-CM

## 2020-09-22 DIAGNOSIS — E66.9 OBESITY (BMI 30-39.9): ICD-10-CM

## 2020-09-22 DIAGNOSIS — L08.9 INFECTION OF TOE: Primary | ICD-10-CM

## 2020-09-22 DIAGNOSIS — G31.84 MCI (MILD COGNITIVE IMPAIRMENT): ICD-10-CM

## 2020-09-22 PROBLEM — I63.9 STROKE (HCC): Status: ACTIVE | Noted: 2019-02-01

## 2020-09-22 PROBLEM — Z86.73 HISTORY OF CVA (CEREBROVASCULAR ACCIDENT): Status: RESOLVED | Noted: 2020-09-16 | Resolved: 2020-09-22

## 2020-09-22 LAB
APPEARANCE UR: CLEAR
BASOPHILS # BLD: 0 K/UL (ref 0–0.1)
BASOPHILS NFR BLD: 1 % (ref 0–1)
BILIRUB UR QL: NEGATIVE
COLOR UR: NORMAL
COMMENT, HOLDF: NORMAL
CREAT UR-MCNC: 17.3 MG/DL
DIFFERENTIAL METHOD BLD: NORMAL
EOSINOPHIL # BLD: 0.1 K/UL (ref 0–0.4)
EOSINOPHIL NFR BLD: 2 % (ref 0–7)
ERYTHROCYTE [DISTWIDTH] IN BLOOD BY AUTOMATED COUNT: 13 % (ref 11.5–14.5)
GLUCOSE UR STRIP.AUTO-MCNC: NEGATIVE MG/DL
HCT VFR BLD AUTO: 41.1 % (ref 36.6–50.3)
HGB BLD-MCNC: 13.3 G/DL (ref 12.1–17)
HGB UR QL STRIP: NEGATIVE
IMM GRANULOCYTES # BLD AUTO: 0 K/UL (ref 0–0.04)
IMM GRANULOCYTES NFR BLD AUTO: 0 % (ref 0–0.5)
KETONES UR QL STRIP.AUTO: NEGATIVE MG/DL
LEUKOCYTE ESTERASE UR QL STRIP.AUTO: NEGATIVE
LYMPHOCYTES # BLD: 1.8 K/UL (ref 0.8–3.5)
LYMPHOCYTES NFR BLD: 24 % (ref 12–49)
MCH RBC QN AUTO: 30.1 PG (ref 26–34)
MCHC RBC AUTO-ENTMCNC: 32.4 G/DL (ref 30–36.5)
MCV RBC AUTO: 93 FL (ref 80–99)
MICROALBUMIN UR-MCNC: 0.7 MG/DL
MICROALBUMIN/CREAT UR-RTO: 40 MG/G (ref 0–30)
MONOCYTES # BLD: 0.6 K/UL (ref 0–1)
MONOCYTES NFR BLD: 8 % (ref 5–13)
NEUTS SEG # BLD: 4.9 K/UL (ref 1.8–8)
NEUTS SEG NFR BLD: 65 % (ref 32–75)
NITRITE UR QL STRIP.AUTO: NEGATIVE
NRBC # BLD: 0 K/UL (ref 0–0.01)
NRBC BLD-RTO: 0 PER 100 WBC
PH UR STRIP: 6.5 [PH] (ref 5–8)
PLATELET # BLD AUTO: 187 K/UL (ref 150–400)
PMV BLD AUTO: 12 FL (ref 8.9–12.9)
PROT UR STRIP-MCNC: NEGATIVE MG/DL
RBC # BLD AUTO: 4.42 M/UL (ref 4.1–5.7)
SAMPLES BEING HELD,HOLD: NORMAL
SP GR UR REFRACTOMETRY: 1.01 (ref 1–1.03)
UROBILINOGEN UR QL STRIP.AUTO: 0.2 EU/DL (ref 0.2–1)
WBC # BLD AUTO: 7.4 K/UL (ref 4.1–11.1)

## 2020-09-22 PROCEDURE — 99443 PR PHYS/QHP TELEPHONE EVALUATION 21-30 MIN: CPT | Performed by: FAMILY MEDICINE

## 2020-09-22 RX ORDER — AMOXICILLIN AND CLAVULANATE POTASSIUM 875; 125 MG/1; MG/1
1 TABLET, FILM COATED ORAL 2 TIMES DAILY
Qty: 20 TAB | Refills: 0 | Status: SHIPPED | OUTPATIENT
Start: 2020-09-22 | End: 2020-10-02
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
## 2020-09-22 NOTE — PROGRESS NOTES
Marifer Chan is a 60 y.o. male  HIPAA verified by two patient identifiers. Health Maintenance Due   Topic    Pneumococcal 0-64 years (1 of 1 - PPSV23)    Foot Exam Q1     Eye Exam Retinal or Dilated     Shingrix Vaccine Age 50> (1 of 2)    FOBT Q1Y Age 54-65     MICROALBUMIN Q1     Lipid Screen     Flu Vaccine (1)     .   Chief Complaint   Patient presents with    Toe Injury     right great toe infected      Patient-Reported Vitals 9/16/2020   Patient-Reported Weight 285   Patient-Reported SpO2 97   Patient-Reported Systolic  317   Patient-Reported Diastolic 92       Pain Scale: ?/10  Pain Location: toe right great toe       Phone call Only # [phone]

## 2020-09-22 NOTE — Clinical Note
Hi Dr. Oseas Polanco,    I am covering from Dr. Paulino Velasco. Evidently this pt has a loosely documented ?neurological sleep apnea per notes from [employer] in media. He is requesting a new cpap machine, Air Sense 10 set at 10 cm, tubing, mask, filters. He has not had a sleep study in 15-20 yrs. I am referring him to you. He is skeptical about having an in house sleep study. Would you mind evaluating him and creating a palatable medically sound approach please? Thanks a million.     Risa Hill

## 2020-09-23 LAB
ALBUMIN SERPL-MCNC: 4.4 G/DL (ref 3.5–5)
ALBUMIN/GLOB SERPL: 1.6 {RATIO} (ref 1.1–2.2)
ALP SERPL-CCNC: 98 U/L (ref 45–117)
ALT SERPL-CCNC: 43 U/L (ref 12–78)
ANION GAP SERPL CALC-SCNC: 5 MMOL/L (ref 5–15)
AST SERPL-CCNC: 21 U/L (ref 15–37)
BILIRUB SERPL-MCNC: 1.3 MG/DL (ref 0.2–1)
BUN SERPL-MCNC: 12 MG/DL (ref 6–20)
BUN/CREAT SERPL: 15 (ref 12–20)
CALCIUM SERPL-MCNC: 10 MG/DL (ref 8.5–10.1)
CHLORIDE SERPL-SCNC: 105 MMOL/L (ref 97–108)
CHOLEST SERPL-MCNC: 185 MG/DL
CO2 SERPL-SCNC: 29 MMOL/L (ref 21–32)
CREAT SERPL-MCNC: 0.82 MG/DL (ref 0.7–1.3)
EST. AVERAGE GLUCOSE BLD GHB EST-MCNC: 146 MG/DL
GLOBULIN SER CALC-MCNC: 2.8 G/DL (ref 2–4)
GLUCOSE SERPL-MCNC: 154 MG/DL (ref 65–100)
HBA1C MFR BLD: 6.7 % (ref 4–5.6)
HDLC SERPL-MCNC: 60 MG/DL
HDLC SERPL: 3.1 {RATIO} (ref 0–5)
LDLC SERPL CALC-MCNC: 99.4 MG/DL (ref 0–100)
LIPID PROFILE,FLP: NORMAL
POTASSIUM SERPL-SCNC: 4.5 MMOL/L (ref 3.5–5.1)
PROT SERPL-MCNC: 7.2 G/DL (ref 6.4–8.2)
PSA SERPL-MCNC: 0.6 NG/ML (ref 0.01–4)
SODIUM SERPL-SCNC: 139 MMOL/L (ref 136–145)
TRIGL SERPL-MCNC: 128 MG/DL (ref ?–150)
TSH SERPL DL<=0.05 MIU/L-ACNC: 1.33 UIU/ML (ref 0.36–3.74)
VLDLC SERPL CALC-MCNC: 25.6 MG/DL

## 2020-10-16 PROBLEM — Z12.5 PROSTATE CANCER SCREENING: Status: RESOLVED | Noted: 2020-09-16 | Resolved: 2020-10-16

## 2021-02-04 ENCOUNTER — OFFICE VISIT (OUTPATIENT)
Dept: SLEEP MEDICINE | Age: 66
End: 2021-02-04
Payer: MEDICARE

## 2021-02-04 VITALS
OXYGEN SATURATION: 98 % | HEIGHT: 75 IN | WEIGHT: 290.7 LBS | BODY MASS INDEX: 36.14 KG/M2 | HEART RATE: 83 BPM | SYSTOLIC BLOOD PRESSURE: 117 MMHG | DIASTOLIC BLOOD PRESSURE: 75 MMHG

## 2021-02-04 DIAGNOSIS — I10 ESSENTIAL HYPERTENSION: ICD-10-CM

## 2021-02-04 DIAGNOSIS — G47.33 OBSTRUCTIVE SLEEP APNEA (ADULT) (PEDIATRIC): ICD-10-CM

## 2021-02-04 DIAGNOSIS — G47.31 CSA (CENTRAL SLEEP APNEA): Primary | ICD-10-CM

## 2021-02-04 DIAGNOSIS — Z79.4 CONTROLLED TYPE 2 DIABETES MELLITUS WITH DIABETIC POLYNEUROPATHY, WITH LONG-TERM CURRENT USE OF INSULIN (HCC): ICD-10-CM

## 2021-02-04 DIAGNOSIS — E11.42 CONTROLLED TYPE 2 DIABETES MELLITUS WITH DIABETIC POLYNEUROPATHY, WITH LONG-TERM CURRENT USE OF INSULIN (HCC): ICD-10-CM

## 2021-02-04 PROCEDURE — 99204 OFFICE O/P NEW MOD 45 MIN: CPT | Performed by: INTERNAL MEDICINE

## 2021-02-04 NOTE — PATIENT INSTRUCTIONS
217 [facility]., [place]. [address], [address] Tel.  [phone] Fax. 100 [facility] [address] [place], [address] [facility] Tel.  [phone] Fax. [phone] [address] Tel.  [phone] Fax. [phone] Sleep Apnea: After Your Visit Your Care Instructions Sleep apnea occurs when you frequently stop breathing for 10 seconds or longer during sleep. It can be mild to severe, based on the number of times per hour that you stop breathing or have slowed breathing. Blocked or narrowed airways in your nose, mouth, or throat can cause sleep apnea. Your airway can become blocked when your throat muscles and tongue relax during sleep. Sleep apnea is common, occurring in 1 out of 20 individuals. Individuals having any of the following characteristics should be evaluated and treated right away due to high risk and detrimental consequences from untreated sleep apnea: 
1. Obesity 2. Congestive Heart failure 3. Atrial Fibrillation 4. Uncontrolled Hypertension 5. Type II Diabetes 6. Night-time Arrhythmias 7. Stroke 8. Pulmonary Hypertension 9. High-risk Driving Populations (pilots, truck drivers, etc.) 10. Patients Considering Weight-loss Surgery How do you know you have sleep apnea? You probably have sleep apnea if you answer 'yes' to 3 or more of the following questions: S - Have you been told that you Snore? T - Are you often Tired during the day? O - Has anyone Observed you stop breathing while sleeping? P- Do you have (or are being treated for) high blood Pressure? B - Are you obese (Body Mass Index > 35)? A - Is your Age 48years old or older? N - Is your Neck size greater than 16 inches? G - Are you male Gender? A sleep physician can prescribe a breathing device that prevents tissues in the throat from blocking your airway. Or your doctor may recommend using a dental device (oral breathing device) to help keep your airway open. In some cases, surgery may be needed to remove enlarged tissues in the throat. Follow-up care is a key part of your treatment and safety. Be sure to make and go to all appointments, and call your doctor if you are having problems. It's also a good idea to know your test results and keep a list of the medicines you take. How can you care for yourself at home? · Lose weight, if needed. It may reduce the number of times you stop breathing or have slowed breathing. · Go to bed at the same time every night. · Sleep on your side. It may stop mild apnea. If you tend to roll onto your back, sew a pocket in the back of your pajama top. Put a tennis ball into the pocket, and stitch the pocket shut. This will help keep you from sleeping on your back. · Avoid alcohol and medicines such as sleeping pills and sedatives before bed. · Do not smoke. Smoking can make sleep apnea worse. If you need help quitting, talk to your doctor about stop-smoking programs and medicines. These can increase your chances of quitting for good. · Prop up the head of your bed 4 to 6 inches by putting bricks under the legs of the bed. · Treat breathing problems, such as a stuffy nose, caused by a cold or allergies. · Use a continuous positive airway pressure (CPAP) breathing machine if lifestyle changes do not help your apnea and your doctor recommends it. The machine keeps your airway from closing when you sleep. · If CPAP does not help you, ask your doctor whether you should try other breathing machines. A bilevel positive airway pressure machine has two types of air pressureâone for breathing in and one for breathing out. Another device raises or lowers air pressure as needed while you breathe. · If your nose feels dry or bleeds when using one of these machines, talk with your doctor about increasing moisture in the air. A humidifier may help. · If your nose is runny or stuffy from using a breathing machine, talk with your doctor about using decongestants or a corticosteroid nasal spray. When should you call for help? Watch closely for changes in your health, and be sure to contact your doctor if: 
· You still have sleep apnea even though you have made lifestyle changes. · You are thinking of trying a device such as CPAP. · You are having problems using a CPAP or similar machine. Where can you learn more? Go to ObserveIT. Enter Z029 in the search box to learn more about \"Sleep Apnea: After Your Visit. \"  
© 5934-4093 Healthwise, Incorporated. Care instructions adapted under license by [facility] [employer] (which disclaims liability or warranty for this information). This care instruction is for use with your licensed healthcare professional. If you have questions about a medical condition or this instruction, always ask your healthcare professional. [place] [employer] any warranty or liability for your use of this information. PROPER SLEEP HYGIENE What to avoid · Do not have drinks with caffeine, such as coffee or black tea, for 8 hours before bed. · Do not smoke or use other types of tobacco near bedtime. Nicotine is a stimulant and can keep you awake. · Avoid drinking alcohol late in the evening, because it can cause you to wake in the middle of the night. · Do not eat a big meal close to bedtime. If you are hungry, eat a light snack. · Do not drink a lot of water close to bedtime, because the need to urinate may wake you up during the night. · Do not read or watch TV in bed. Use the bed only for sleeping and sexual activity. What to try · Go to bed at the same time every night, and wake up at the same time every morning. Do not take naps during the day. · Keep your bedroom quiet, dark, and cool. · Get regular exercise, but not within 3 to 4 hours of your bedtime. Blanchie Bevels · Sleep on a comfortable pillow and mattress. · If watching the clock makes you anxious, turn it facing away from you so you cannot see the time. · If you worry when you lie down, start a worry book. Well before bedtime, write down your worries, and then set the book and your concerns aside. · Try meditation or other relaxation techniques before you go to bed. · If you cannot fall asleep, get up and go to another room until you feel sleepy. Do something relaxing. Repeat your bedtime routine before you go to bed again. · Make your house quiet and calm about an hour before bedtime. Turn down the lights, turn off the TV, log off the computer, and turn down the volume on music. This can help you relax after a busy day. Drowsy Driving The [place] [address] cites drowsiness as a causing factor in more than 497,979 police reported crashes annually, resulting in 76,000 injuries and 1,500 deaths. Other surveys suggest 55% of people polled have driven while drowsy in the past year, 23% had fallen asleep but not crashed, 3% crashed, and 2% had and accident due to drowsy driving. Who is at risk? Young Drivers: One study of drowsy driving accidents states that 55% of the drivers were under 25 years. Of those, 75% were male. Shift Workers and Travelers: People who work overnight or travel across time zones frequently are at higher risk of experiencing Circadian Rhythm Disorders. They are trying to work and function when their body is programed to sleep. Sleep Deprived: Lack of sleep has a serious impact on your ability to pay attention or focus on a task. Consistently getting less than the average of 8 hours your body needs creates partial or cumulative sleep deprivation. Untreated Sleep Disorders: Sleep Apnea, Narcolepsy, R.L.S., and other sleep disorders (untreated) prevent a person from getting enough restful sleep. This leads to excessive daytime sleepiness and increases the risk for drowsy driving accidents by up to 7 times. Medications / Alcohol: Even over the counter medications can cause drowsiness. Medications that impair a drivers attention should have a warning label. Alcohol naturally makes you sleepy and on its own can cause accidents. Combined with excessive drowsiness its effects are amplified. Signs of Drowsy Driving: * You don't remember driving the last few miles * You may drift out of your melina * You are unable to focus and your thoughts wander * You may yawn more often than normal 
 * You have difficulty keeping your eyes open / nodding off * Missing traffic signs, speeding, or tailgating Prevention-  
Good sleep hygiene, lifestyle and behavioral choices have the most impact on drowsy driving. There is no substitute for sleep and the average person requires 8 hours nightly. If you find yourself driving drowsy, stop and sleep. Consider the sleep hygiene tips provided during your visit as well. Medication Refill Policy: Refills for all medications require 1 week advance notice. Please have your pharmacy fax a refill request. We are unable to fax, or call in \"controled substance\" medications and you will need to pick these prescriptions up from our office. MyChart Activation Thank you for requesting access to [employer]. Please follow the instructions below to securely access and download your online medical record. [employer] allows you to send messages to your doctor, view your test results, renew your prescriptions, schedule appointments, and more. How Do I Sign Up? 1. In your internet browser, go to [email or web address]. 2. Click on the First Time User? Click Here link in the Sign In box. You will see the New Member Sign Up page. 3. Enter your Grasswire Access Code exactly as it appears below. You will not need to use this code after youve completed the sign-up process. If you do not sign up before the expiration date, you must request a new code. Grasswire Access Code: [email or web address] Expires: 3/21/2021  4:22 PM (This is the date your Grasswire access code will ) 4. Enter the last four digits of your Social Security Number (xxxx) and Date of Birth (mm/dd/yyyy) as indicated and click Submit. You will be taken to the next sign-up page. 5. Create a Grasswire ID. This will be your Grasswire login ID and cannot be changed, so think of one that is secure and easy to remember. 6. Create a Grasswire password. You can change your password at any time. 7. Enter your Password Reset Question and Answer. This can be used at a later time if you forget your password. 8. Enter your e-mail address. You will receive e-mail notification when new information is available in [address]. 9. Click Sign Up. You can now view and download portions of your medical record. 10. Click the Download Summary menu link to download a portable copy of your medical information. Additional Information If you have questions, please call [phone]. Remember, [employer] is NOT to be used for urgent needs. For medical emergencies, dial 911.

## 2021-02-04 NOTE — PROGRESS NOTES
217 [facility]., [place]. [place], [address]la Valladares  Tel.  [phone]  Fax. 100 [facility] 60  [place], 200 [address] [facility]  Tel.  [phone]  Fax. [phone] [address]  Tel.  [phone]  Fax. [phone]         Subjective:      Lucille Richardson is an 60 y.o. male referred for evaluation for a sleep disorder. He complains of needing a new CPAP  associated with previous diagnosis of central or mixed sleep apnea. He had a UPPP and then still needed PAP. He said his apnea was due to multiple concussions in the past. He has used a CPAP for about 10 years. He has not been to a sleep center is many years. Old records not available. .  Symptoms began several years ago, controlled with CPAP 15 cm since that time. He usually can fall asleep in variable minutes. He denies falling asleep while driving. Lucille Richardson does wake up frequently at night. He is bothered by waking up too early and left unable to get back to sleep. He actually sleeps about 6 hours at night and wakes up about 5 times during the night. He does work shifts: Second Shift. Tori Steiner indicates he does get too little sleep at night. His bedtime is 2300. He awakens at 0600. He does not take naps. He takes   naps a week lasting  . He has the following observed behaviors: Loud snoring, Twitching of legs or feet, Pauses in breathing, Grinding teeth, Biting tongue; Other (use comments). Other remarks: waking with a gasp or snort  Download not available. He says it is set on 15 cm and he uses it nightly.   [place] Sleepiness Score: 4      Allergies   Allergen Reactions    Bee Venom Protein (Honey Bee) Other (comments)     STING    Sulfa (Sulfonamide Antibiotics) Hives         Current Outpatient Medications:     EPINEPHrine (EPIPEN) 0.3 mg/0.3 mL injection, , Disp: , Rfl:     hydrocortisone (HYTONE) 2.5 % ointment, , Disp: , Rfl:     NovoLOG Flexpen U-100 Insulin 100 unit/mL (3 mL) inpn, INJECT 25 UNITS SUBCUTANEOUSLY THREE TIMES DAILY BEFORE MEAL(S) FOR 90 DAYS, Disp: , Rfl:     sildenafil citrate (VIAGRA) 100 mg tablet, Take 100 mg by mouth as needed for Erectile Dysfunction. , Disp: , Rfl:     atorvastatin (LIPITOR) 40 mg tablet, Take 40 mg by mouth daily. , Disp: , Rfl:     aspirin delayed-release 81 mg tablet, Take 81 mg by mouth daily. , Disp: , Rfl:     omeprazole (PRILOSEC) 20 mg capsule, Take 20 mg by mouth daily. , Disp: , Rfl:     losartan (COZAAR) 25 mg tablet, Take 25 mg by mouth daily. , Disp: , Rfl:     loratadine (CLARITIN) 10 mg tablet, Take 10 mg by mouth daily. , Disp: , Rfl:     montelukast (SINGULAIR) 10 mg tablet, Take 10 mg by mouth daily. , Disp: , Rfl:     insulin lispro (HUMALOG U-100 INSULIN) 100 unit/mL injection, 30 Units by SubCUTAneous route Before breakfast, lunch, and dinner. Indications: type 2 diabetes mellitus, Disp: , Rfl:     LANTUS SOLOSTAR U-100 INSULIN 100 unit/mL (3 mL) inpn, 45 Units by SubCUTAneous route two (2) times a day., Disp: 30 mL, Rfl: 1    albuterol (PROVENTIL HFA, VENTOLIN HFA, PROAIR HFA) 90 mcg/actuation inhaler, Take 2 Puffs by inhalation every six (6) hours as needed for Shortness of Breath., Disp: , Rfl:     tamsulosin (FLOMAX) 0.4 mg capsule, Take 0.4 mg by mouth daily. , Disp: , Rfl:     cyclobenzaprine (FLEXERIL) 10 mg tablet, Take 10 mg by mouth nightly as needed for Muscle Spasm(s). Indications: muscle spasm, Disp: , Rfl:     acyclovir (ZOVIRAX) 400 mg tablet, Take 400 mg by mouth two (2) times daily as needed. , Disp: , Rfl:      He  has a past medical history of Back pain, BPH (benign prostatic hyperplasia), Concussion, Diabetes mellitus type 2 with neurological manifestations ([address] Diabetic retinopathy of right eye ([facility] (2019), DM neuropathy, type II diabetes mellitus ([address] Dyslipidemia, Foot fracture, HSV (herpes simplex virus) infection, Hyperlipidemia, Hypertension, Major depression, Sleep apnea, Stroke-like symptoms (02/2019), and Toe infection. He  has a past surgical history that includes hx orthopaedic (Bilateral, 2006); hx heent (12/1996); hx tonsil and adenoidectomy (1969); hx septoplasty; and hx heent. He family history includes No Known Problems in his father; Thyroid Disease in his mother. He  reports that he has never smoked. He has never used smokeless tobacco. He reports that he does not drink alcohol. Review of Systems:  Constitutional:  + weight gain. Eyes:  No blurred vision. CVS:  No significant chest pain  Pulm:  No significant shortness of breath unless exerting himself  GI:  No significant nausea or vomiting  :  +significant nocturia  Musculoskeletal:  + significant joint pain at night  Skin:  No significant rashes  Neuro:  No significant dizziness   Psych:  No active mood issues    Sleep Review of Systems: notable for no difficulty falling asleep; +frequent awakenings at night;     Objective:     Visit Vitals  /75 (BP 1 Location: Left upper arm, BP Patient Position: Sitting)   Pulse 83   Ht 6' 3\" (1.905 m)   Wt 290 lb 11.2 oz (131.9 kg)   SpO2 98%   BMI 36.34 kg/m²         General:   Not in acute distress   Eyes:  Anicteric sclerae, no obvious strabismus   Nose:  No obvious nasal septum deviation    Neck:    ; midline trachea   Chest/Lungs:  Equal lung expansion, clear on auscultation    CVS:  Normal rate, regular rhythm; no JVD   Skin:  Warm to touch; no obvious rashes   Neuro:  No focal deficits ; no obvious tremor    Psych:  Normal affect,  normal countenance;          Assessment:       ICD-10-CM ICD-9-CM    1. CSA (central sleep apnea)  G47.31 780.57 POLYSOMNOGRAPHY 1 NIGHT   2. Obstructive sleep apnea (adult) (pediatric)  G47.33 327.23    3. Controlled type 2 diabetes mellitus with diabetic polyneuropathy, with long-term current use of insulin ([facility])  E11.42 250.60     Z79.4 357.2      V58.67    4.  Essential hypertension  I10 401.9          Plan:     * The patient currently has a High Risk for having sleep apnea. STOP-BANG score 6.  * PSG was ordered for initial evaluation of apnea type and severity. .    Previous records will be requested  He will bring PAP in for download. * He was provided information on sleep apnea including coresponding risk factors and the importance of proper treatment. * Counseling was provided regarding proper sleep hygiene and safe driving. *Treatment options for sleep apnea were reviewed. He will be called with results and next steps. In the meantime, he continues on his current setting    3. Type II diabetes - he continues on his current regimen. I have reviewed the relationship between sleep disordered breathing as it relates to diabetes. 4. Hypertension - he continues on his current regimen. I have reviewed the relationship between hypertension as it relates to sleep-disordered breathing. Thank you for the referral.  I will keep you informed of his progress.   Electronically signed by    Dustin Spatz, MD  Diplomate in Sleep Medicine  [facility]

## 2021-02-04 NOTE — Clinical Note
Thank you for the referral.  I will keep you informed of his progress.   [address],  [place]ariana Huerta

## 2021-02-05 ENCOUNTER — DOCUMENTATION ONLY (OUTPATIENT)
Dept: SLEEP MEDICINE | Age: 66
End: 2021-02-05

## 2021-02-05 ENCOUNTER — TELEPHONE (OUTPATIENT)
Dept: SLEEP MEDICINE | Age: 66
End: 2021-02-05

## 2021-02-09 ENCOUNTER — OFFICE VISIT (OUTPATIENT)
Dept: SLEEP MEDICINE | Age: 66
End: 2021-02-09

## 2021-02-09 DIAGNOSIS — G47.33 OBSTRUCTIVE SLEEP APNEA (ADULT) (PEDIATRIC): Primary | ICD-10-CM

## 2021-02-09 NOTE — PROGRESS NOTES
In Office Compliance Download           Patients PAP device was downloaded in office and added to patients chart.

## 2021-02-10 ENCOUNTER — TELEPHONE (OUTPATIENT)
Dept: SLEEP MEDICINE | Age: 66
End: 2021-02-10

## 2021-02-10 NOTE — TELEPHONE ENCOUNTER
Patient called and updated insurance information. He stated he will give us a call back to schedule the sleep study.

## 2021-03-04 ENCOUNTER — TELEPHONE (OUTPATIENT)
Dept: SLEEP MEDICINE | Age: 66
End: 2021-03-04

## 2021-03-04 NOTE — TELEPHONE ENCOUNTER
Follow-up call to patient to schedule his sleep study per Dr. Sergio Gunn. He is still in the process of straightening out his insurance coverage. Currently he has Eileen Aguero 1 Medicare as primary as of 3/1/2021 and Medicaid Expansion. He has a specialist who is helping him out with transitioning his Medicaid coverage to a [address] [place] [address]. Patient will call once this is resolved to schedule.

## 2021-08-03 PROBLEM — I10 ESSENTIAL HYPERTENSION: Status: RESOLVED | Noted: 2020-09-16 | Resolved: 2021-08-03

## 2021-08-03 PROBLEM — E78.5 HYPERLIPIDEMIA: Status: RESOLVED | Noted: 2020-01-02 | Resolved: 2021-08-03

## 2022-03-16 ENCOUNTER — HOSPITAL ENCOUNTER (INPATIENT)
Age: 67
LOS: 2 days | Discharge: HOME | DRG: 638 | End: 2022-03-18
Attending: EMERGENCY MEDICINE | Admitting: STUDENT IN AN ORGANIZED HEALTH CARE EDUCATION/TRAINING PROGRAM
Payer: MEDICARE

## 2022-03-16 ENCOUNTER — APPOINTMENT (OUTPATIENT)
Dept: GENERAL RADIOLOGY | Age: 67
DRG: 638 | End: 2022-03-16
Attending: EMERGENCY MEDICINE
Payer: MEDICARE

## 2022-03-16 DIAGNOSIS — L08.9 DIABETIC FOOT INFECTION (HCC): Primary | ICD-10-CM

## 2022-03-16 DIAGNOSIS — E11.628 DIABETIC FOOT INFECTION (HCC): Primary | ICD-10-CM

## 2022-03-16 DIAGNOSIS — M86.9 OSTEOMYELITIS OF FOOT, UNSPECIFIED LATERALITY, UNSPECIFIED TYPE (HCC): ICD-10-CM

## 2022-03-16 LAB
ANION GAP SERPL CALC-SCNC: 5 MMOL/L (ref 5–15)
BASOPHILS # BLD: 0.1 K/UL (ref 0–0.1)
BASOPHILS NFR BLD: 1 % (ref 0–1)
BUN SERPL-MCNC: 15 MG/DL (ref 6–20)
BUN/CREAT SERPL: 21 (ref 12–20)
CALCIUM SERPL-MCNC: 8.8 MG/DL (ref 8.5–10.1)
CHLORIDE SERPL-SCNC: 109 MMOL/L (ref 97–108)
CO2 SERPL-SCNC: 26 MMOL/L (ref 21–32)
CREAT SERPL-MCNC: 0.7 MG/DL (ref 0.7–1.3)
DIFFERENTIAL METHOD BLD: ABNORMAL
EOSINOPHIL # BLD: 0.1 K/UL (ref 0–0.4)
EOSINOPHIL NFR BLD: 1 % (ref 0–7)
ERYTHROCYTE [DISTWIDTH] IN BLOOD BY AUTOMATED COUNT: 12.8 % (ref 11.5–14.5)
ERYTHROCYTE [SEDIMENTATION RATE] IN BLOOD: 29 MM/HR (ref 0–20)
GLUCOSE BLD STRIP.AUTO-MCNC: 197 MG/DL (ref 65–117)
GLUCOSE SERPL-MCNC: 181 MG/DL (ref 65–100)
HCT VFR BLD AUTO: 36.5 % (ref 36.6–50.3)
HGB BLD-MCNC: 12 G/DL (ref 12.1–17)
IMM GRANULOCYTES # BLD AUTO: 0 K/UL (ref 0–0.04)
IMM GRANULOCYTES NFR BLD AUTO: 0 % (ref 0–0.5)
LYMPHOCYTES # BLD: 1.9 K/UL (ref 0.8–3.5)
LYMPHOCYTES NFR BLD: 28 % (ref 12–49)
MCH RBC QN AUTO: 29.3 PG (ref 26–34)
MCHC RBC AUTO-ENTMCNC: 32.9 G/DL (ref 30–36.5)
MCV RBC AUTO: 89 FL (ref 80–99)
MONOCYTES # BLD: 0.6 K/UL (ref 0–1)
MONOCYTES NFR BLD: 8 % (ref 5–13)
NEUTS SEG # BLD: 4.4 K/UL (ref 1.8–8)
NEUTS SEG NFR BLD: 62 % (ref 32–75)
NRBC # BLD: 0 K/UL (ref 0–0.01)
NRBC BLD-RTO: 0 PER 100 WBC
PLATELET # BLD AUTO: 215 K/UL (ref 150–400)
PMV BLD AUTO: 10.6 FL (ref 8.9–12.9)
POTASSIUM SERPL-SCNC: 3.2 MMOL/L (ref 3.5–5.1)
RBC # BLD AUTO: 4.1 M/UL (ref 4.1–5.7)
SERVICE CMNT-IMP: ABNORMAL
SODIUM SERPL-SCNC: 140 MMOL/L (ref 136–145)
WBC # BLD AUTO: 7 K/UL (ref 4.1–11.1)

## 2022-03-16 PROCEDURE — 82962 GLUCOSE BLOOD TEST: CPT

## 2022-03-16 PROCEDURE — 80048 BASIC METABOLIC PNL TOTAL CA: CPT

## 2022-03-16 PROCEDURE — 65270000029 HC RM PRIVATE

## 2022-03-16 PROCEDURE — 99285 EMERGENCY DEPT VISIT HI MDM: CPT

## 2022-03-16 PROCEDURE — 85025 COMPLETE CBC W/AUTO DIFF WBC: CPT

## 2022-03-16 PROCEDURE — 73660 X-RAY EXAM OF TOE(S): CPT

## 2022-03-16 PROCEDURE — 85652 RBC SED RATE AUTOMATED: CPT

## 2022-03-16 PROCEDURE — 83036 HEMOGLOBIN GLYCOSYLATED A1C: CPT

## 2022-03-16 RX ORDER — ACETAMINOPHEN 325 MG/1
650 TABLET ORAL
Status: DISCONTINUED | OUTPATIENT
Start: 2022-03-16 | End: 2022-03-18 | Stop reason: HOSPADM

## 2022-03-16 RX ORDER — ACETAMINOPHEN 650 MG/1
650 SUPPOSITORY RECTAL
Status: DISCONTINUED | OUTPATIENT
Start: 2022-03-16 | End: 2022-03-18 | Stop reason: HOSPADM

## 2022-03-16 RX ORDER — POLYETHYLENE GLYCOL 3350 17 G/17G
17 POWDER, FOR SOLUTION ORAL DAILY PRN
Status: DISCONTINUED | OUTPATIENT
Start: 2022-03-16 | End: 2022-03-18 | Stop reason: HOSPADM

## 2022-03-16 RX ORDER — TAMSULOSIN HYDROCHLORIDE 0.4 MG/1
0.4 CAPSULE ORAL DAILY
Status: DISCONTINUED | OUTPATIENT
Start: 2022-03-17 | End: 2022-03-18 | Stop reason: HOSPADM

## 2022-03-16 RX ORDER — MONTELUKAST SODIUM 10 MG/1
10 TABLET ORAL DAILY
Status: DISCONTINUED | OUTPATIENT
Start: 2022-03-17 | End: 2022-03-18 | Stop reason: HOSPADM

## 2022-03-16 RX ORDER — PANTOPRAZOLE SODIUM 40 MG/1
40 TABLET, DELAYED RELEASE ORAL
Status: DISCONTINUED | OUTPATIENT
Start: 2022-03-17 | End: 2022-03-18 | Stop reason: HOSPADM

## 2022-03-16 RX ORDER — SODIUM CHLORIDE 0.9 % (FLUSH) 0.9 %
5-40 SYRINGE (ML) INJECTION AS NEEDED
Status: DISCONTINUED | OUTPATIENT
Start: 2022-03-16 | End: 2022-03-18 | Stop reason: HOSPADM

## 2022-03-16 RX ORDER — MAGNESIUM SULFATE 100 %
4 CRYSTALS MISCELLANEOUS AS NEEDED
Status: DISCONTINUED | OUTPATIENT
Start: 2022-03-16 | End: 2022-03-18 | Stop reason: HOSPADM

## 2022-03-16 RX ORDER — SODIUM CHLORIDE 9 MG/ML
125 INJECTION, SOLUTION INTRAVENOUS CONTINUOUS
Status: DISCONTINUED | OUTPATIENT
Start: 2022-03-16 | End: 2022-03-17

## 2022-03-16 RX ORDER — VANCOMYCIN HYDROCHLORIDE
1250 EVERY 12 HOURS
Status: DISCONTINUED | OUTPATIENT
Start: 2022-03-17 | End: 2022-03-18 | Stop reason: HOSPADM

## 2022-03-16 RX ORDER — INSULIN GLARGINE 100 [IU]/ML
45 INJECTION, SOLUTION SUBCUTANEOUS 2 TIMES DAILY
Status: DISCONTINUED | OUTPATIENT
Start: 2022-03-16 | End: 2022-03-18 | Stop reason: HOSPADM

## 2022-03-16 RX ORDER — ONDANSETRON 4 MG/1
4 TABLET, ORALLY DISINTEGRATING ORAL
Status: DISCONTINUED | OUTPATIENT
Start: 2022-03-16 | End: 2022-03-18 | Stop reason: HOSPADM

## 2022-03-16 RX ORDER — ONDANSETRON 2 MG/ML
4 INJECTION INTRAMUSCULAR; INTRAVENOUS
Status: DISCONTINUED | OUTPATIENT
Start: 2022-03-16 | End: 2022-03-18 | Stop reason: HOSPADM

## 2022-03-16 RX ORDER — LOSARTAN POTASSIUM 25 MG/1
25 TABLET ORAL DAILY
Status: DISCONTINUED | OUTPATIENT
Start: 2022-03-17 | End: 2022-03-18 | Stop reason: HOSPADM

## 2022-03-16 RX ORDER — SODIUM CHLORIDE 0.9 % (FLUSH) 0.9 %
5-40 SYRINGE (ML) INJECTION EVERY 8 HOURS
Status: DISCONTINUED | OUTPATIENT
Start: 2022-03-16 | End: 2022-03-18 | Stop reason: HOSPADM

## 2022-03-16 RX ORDER — ATORVASTATIN CALCIUM 40 MG/1
40 TABLET, FILM COATED ORAL DAILY
Status: DISCONTINUED | OUTPATIENT
Start: 2022-03-17 | End: 2022-03-18 | Stop reason: HOSPADM
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
## 2022-03-16 NOTE — ED NOTES
TRANSFER - IN REPORT:    Verbal report received from [facility] on Kesha Heads  being received  for routine progression of care      Report consisted of patients Situation, Background, Assessment and   Recommendations(SBAR). Information from the following report(s) SBAR, Kardex and ED Summary was reviewed with the receiving nurse. Opportunity for questions and clarification was provided. Assessment completed upon arrival to unit and care assumed.

## 2022-03-16 NOTE — ED TRIAGE NOTES
Pt arrives c/o bilateral great toe wounds. Pt c/o home issues and unable to get proper food. Pt reports having a positive MRSA culture to right great toe.

## 2022-03-17 ENCOUNTER — APPOINTMENT (OUTPATIENT)
Dept: MRI IMAGING | Age: 67
DRG: 638 | End: 2022-03-17
Attending: HOSPITALIST
Payer: MEDICARE

## 2022-03-17 LAB
ANION GAP SERPL CALC-SCNC: 2 MMOL/L (ref 5–15)
BUN SERPL-MCNC: 16 MG/DL (ref 6–20)
BUN/CREAT SERPL: 22 (ref 12–20)
CALCIUM SERPL-MCNC: 9.2 MG/DL (ref 8.5–10.1)
CHLORIDE SERPL-SCNC: 111 MMOL/L (ref 97–108)
CO2 SERPL-SCNC: 27 MMOL/L (ref 21–32)
CREAT SERPL-MCNC: 0.72 MG/DL (ref 0.7–1.3)
EST. AVERAGE GLUCOSE BLD GHB EST-MCNC: 163 MG/DL
GLUCOSE BLD STRIP.AUTO-MCNC: 100 MG/DL (ref 65–117)
GLUCOSE BLD STRIP.AUTO-MCNC: 150 MG/DL (ref 65–117)
GLUCOSE BLD STRIP.AUTO-MCNC: 173 MG/DL (ref 65–117)
GLUCOSE BLD STRIP.AUTO-MCNC: 89 MG/DL (ref 65–117)
GLUCOSE SERPL-MCNC: 200 MG/DL (ref 65–100)
HBA1C MFR BLD: 7.3 % (ref 4–5.6)
POTASSIUM SERPL-SCNC: 3.3 MMOL/L (ref 3.5–5.1)
SERVICE CMNT-IMP: ABNORMAL
SERVICE CMNT-IMP: ABNORMAL
SERVICE CMNT-IMP: NORMAL
SERVICE CMNT-IMP: NORMAL
SODIUM SERPL-SCNC: 140 MMOL/L (ref 136–145)

## 2022-03-17 PROCEDURE — 73720 MRI LWR EXTREMITY W/O&W/DYE: CPT

## 2022-03-17 PROCEDURE — 74011000250 HC RX REV CODE- 250: Performed by: HOSPITALIST

## 2022-03-17 PROCEDURE — 80048 BASIC METABOLIC PNL TOTAL CA: CPT

## 2022-03-17 PROCEDURE — 74011250637 HC RX REV CODE- 250/637: Performed by: HOSPITALIST

## 2022-03-17 PROCEDURE — 65270000029 HC RM PRIVATE

## 2022-03-17 PROCEDURE — A9576 INJ PROHANCE MULTIPACK: HCPCS

## 2022-03-17 PROCEDURE — 74011636637 HC RX REV CODE- 636/637: Performed by: STUDENT IN AN ORGANIZED HEALTH CARE EDUCATION/TRAINING PROGRAM

## 2022-03-17 PROCEDURE — 74011000258 HC RX REV CODE- 258: Performed by: STUDENT IN AN ORGANIZED HEALTH CARE EDUCATION/TRAINING PROGRAM

## 2022-03-17 PROCEDURE — 74011250637 HC RX REV CODE- 250/637: Performed by: STUDENT IN AN ORGANIZED HEALTH CARE EDUCATION/TRAINING PROGRAM

## 2022-03-17 PROCEDURE — 74011000250 HC RX REV CODE- 250: Performed by: STUDENT IN AN ORGANIZED HEALTH CARE EDUCATION/TRAINING PROGRAM

## 2022-03-17 PROCEDURE — 94760 N-INVAS EAR/PLS OXIMETRY 1: CPT

## 2022-03-17 PROCEDURE — 77010033678 HC OXYGEN DAILY

## 2022-03-17 PROCEDURE — 82962 GLUCOSE BLOOD TEST: CPT

## 2022-03-17 PROCEDURE — 74011250636 HC RX REV CODE- 250/636: Performed by: STUDENT IN AN ORGANIZED HEALTH CARE EDUCATION/TRAINING PROGRAM

## 2022-03-17 PROCEDURE — 74011250636 HC RX REV CODE- 250/636: Performed by: HOSPITALIST

## 2022-03-17 PROCEDURE — 74011250636 HC RX REV CODE- 250/636: Performed by: EMERGENCY MEDICINE

## 2022-03-17 PROCEDURE — 36415 COLL VENOUS BLD VENIPUNCTURE: CPT

## 2022-03-17 PROCEDURE — 74011250636 HC RX REV CODE- 250/636

## 2022-03-17 PROCEDURE — 94660 CPAP INITIATION&MGMT: CPT

## 2022-03-17 RX ORDER — SODIUM CHLORIDE 0.9 % (FLUSH) 0.9 %
10 SYRINGE (ML) INJECTION
Status: COMPLETED | OUTPATIENT
Start: 2022-03-18 | End: 2022-03-17

## 2022-03-17 RX ORDER — ENOXAPARIN SODIUM 100 MG/ML
40 INJECTION SUBCUTANEOUS EVERY 24 HOURS
Status: DISCONTINUED | OUTPATIENT
Start: 2022-03-17 | End: 2022-03-18 | Stop reason: HOSPADM

## 2022-03-17 RX ORDER — POTASSIUM CHLORIDE 750 MG/1
40 TABLET, FILM COATED, EXTENDED RELEASE ORAL
Status: COMPLETED | OUTPATIENT
Start: 2022-03-17 | End: 2022-03-17

## 2022-03-17 RX ADMIN — VANCOMYCIN HYDROCHLORIDE 1250 MG: 1.25 INJECTION, POWDER, LYOPHILIZED, FOR SOLUTION INTRAVENOUS at 23:59

## 2022-03-17 RX ADMIN — WATER 2 G: 1 INJECTION INTRAMUSCULAR; INTRAVENOUS; SUBCUTANEOUS at 10:39

## 2022-03-17 RX ADMIN — SODIUM CHLORIDE, PRESERVATIVE FREE 10 ML: 5 INJECTION INTRAVENOUS at 23:29

## 2022-03-17 RX ADMIN — WATER 2 G: 1 INJECTION INTRAMUSCULAR; INTRAVENOUS; SUBCUTANEOUS at 18:59

## 2022-03-17 RX ADMIN — SODIUM CHLORIDE 125 ML/HR: 9 INJECTION, SOLUTION INTRAVENOUS at 03:37

## 2022-03-17 RX ADMIN — POTASSIUM CHLORIDE 40 MEQ: 750 TABLET, EXTENDED RELEASE ORAL at 10:31

## 2022-03-17 RX ADMIN — INSULIN GLARGINE 45 UNITS: 100 INJECTION, SOLUTION SUBCUTANEOUS at 03:29

## 2022-03-17 RX ADMIN — INSULIN GLARGINE 45 UNITS: 100 INJECTION, SOLUTION SUBCUTANEOUS at 10:37

## 2022-03-17 RX ADMIN — ATORVASTATIN CALCIUM 40 MG: 40 TABLET, FILM COATED ORAL at 10:31

## 2022-03-17 RX ADMIN — SODIUM CHLORIDE 125 ML/HR: 9 INJECTION, SOLUTION INTRAVENOUS at 11:55

## 2022-03-17 RX ADMIN — VANCOMYCIN HYDROCHLORIDE 1250 MG: 1.25 INJECTION, POWDER, LYOPHILIZED, FOR SOLUTION INTRAVENOUS at 11:55

## 2022-03-17 RX ADMIN — SODIUM CHLORIDE, PRESERVATIVE FREE 10 ML: 5 INJECTION INTRAVENOUS at 03:08

## 2022-03-17 RX ADMIN — ACETAMINOPHEN 650 MG: 325 TABLET ORAL at 04:23

## 2022-03-17 RX ADMIN — GADOTERIDOL 20 ML: 279.3 INJECTION, SOLUTION INTRAVENOUS at 23:28

## 2022-03-17 RX ADMIN — TAMSULOSIN HYDROCHLORIDE 0.4 MG: 0.4 CAPSULE ORAL at 10:31

## 2022-03-17 RX ADMIN — PIPERACILLIN AND TAZOBACTAM 3.38 G: 3; .375 INJECTION, POWDER, LYOPHILIZED, FOR SOLUTION INTRAVENOUS at 03:07

## 2022-03-17 RX ADMIN — SODIUM CHLORIDE, PRESERVATIVE FREE 10 ML: 5 INJECTION INTRAVENOUS at 23:53

## 2022-03-17 RX ADMIN — MONTELUKAST 10 MG: 10 TABLET, FILM COATED ORAL at 10:31

## 2022-03-17 RX ADMIN — PANTOPRAZOLE SODIUM 40 MG: 40 TABLET, DELAYED RELEASE ORAL at 07:20

## 2022-03-17 RX ADMIN — VANCOMYCIN HYDROCHLORIDE 2500 MG: 10 INJECTION, POWDER, LYOPHILIZED, FOR SOLUTION INTRAVENOUS at 00:09

## 2022-03-17 RX ADMIN — SODIUM CHLORIDE, PRESERVATIVE FREE 10 ML: 5 INJECTION INTRAVENOUS at 13:40

## 2022-03-17 RX ADMIN — ACETAMINOPHEN 650 MG: 325 TABLET ORAL at 10:31

## 2022-03-17 RX ADMIN — ENOXAPARIN SODIUM 40 MG: 100 INJECTION SUBCUTANEOUS at 15:54

## 2022-03-17 RX ADMIN — LOSARTAN POTASSIUM 25 MG: 25 TABLET, FILM COATED ORAL at 13:39

## 2022-03-17 NOTE — PROGRESS NOTES
Occupational Therapy Screening:  Services are not indicated at this time. [facility] therapy based on results obtained during the nursing admission assessment. The patients chart was reviewed and the patient is not appropriate for a skilled therapy evaluation at this time. Please consult occupational therapy if any therapy needs arise. Thank you.     Car Cevallos

## 2022-03-17 NOTE — PROGRESS NOTES
Hospitalist Progress Note      Hospital summary: 78 y.o man w/ DM, HTN, dyslipidemia, who presented with foot wounds. He was found to have bilateral great toe cellulitis and possible osteomyelitis. Assessment/Plan:  B/l foot cellulitis and possible great toe OM:  -continue IV vancomycin, change Zosyn to cefepime/Flagyl  -MRI of b/l toes  -podiatry consultation    Type 2 DM:  -continue Lantus  -SSI/POC checks    BPH:  -continue tamsulosin    HTN:  -continue losartan    Dyslipidemia:  -continue statin    PRIAYNK:  -pt wants to use his home CPAP    Hypokalemia:  -replete    Can discontinue IV fluids    Code status: full  DVT prophylaxis: sq Lovenox  Disposition: TBD  ----------------------------------------------    CC: f/u foot infection    S: no acute complaints, couldn't sleep last night because he did not tolerate the hospital's CPAP, no n/v/d or dyspnea     Review of Systems:  Pertinent items are noted in HPI.     O:  Visit Vitals  /77 (BP 1 Location: Left upper arm, BP Patient Position: At rest)   Pulse 72   Temp 97.9 °F (36.6 °C)   Resp 18   Ht 6' 3\" (1.905 m)   Wt 131.5 kg (290 lb)   SpO2 95%   BMI 36.25 kg/m²       PHYSICAL EXAM:  Gen: NAD, non-toxic  HEENT: anicteric sclerae, normal conjunctiva  Neck: supple, trachea midline, no adenopathy  Heart: RRR, no MRG, no JVD, no peripheral edema  Lungs: CTA b/l, non-labored respirations  Abd: soft, NT, ND, BS+  Extr: warm  Skin: dry, b/l great toe discoloration   Neuro: CN II-XII grossly intact, normal speech, moves all extremities  Psych: normal mood, appropriate affect    No intake or output data in the 24 hours ending 03/17/22 1343     Recent labs & imaging reviewed:  Recent Results (from the past 24 hour(s))   GLUCOSE, POC    Collection Time: 03/16/22  7:13 PM   Result Value Ref Range    Glucose (POC) 197 (H) 65 - 117 mg/dL    Performed by Jacki Davison    CBC WITH AUTOMATED DIFF    Collection Time: 03/16/22  9:15 PM   Result Value Ref Range    WBC 7.0 4.1 - 11.1 K/uL    RBC 4.10 4. 10 - 5.70 M/uL    HGB 12.0 (L) 12.1 - 17.0 g/dL    HCT 36.5 (L) 36.6 - 50.3 %    MCV 89.0 80.0 - 99.0 FL    MCH 29.3 26.0 - 34.0 PG    MCHC 32.9 30.0 - 36.5 g/dL    RDW 12.8 11.5 - 14.5 %    PLATELET 085 927 - 820 K/uL    MPV 10.6 8.9 - 12.9 FL    NRBC 0.0 0  WBC    ABSOLUTE NRBC 0.00 0.00 - 0.01 K/uL    NEUTROPHILS 62 32 - 75 %    LYMPHOCYTES 28 12 - 49 %    MONOCYTES 8 5 - 13 %    EOSINOPHILS 1 0 - 7 %    BASOPHILS 1 0 - 1 %    IMMATURE GRANULOCYTES 0 0.0 - 0.5 %    ABS. NEUTROPHILS 4.4 1.8 - 8.0 K/UL    ABS. LYMPHOCYTES 1.9 0.8 - 3.5 K/UL    ABS. MONOCYTES 0.6 0.0 - 1.0 K/UL    ABS. EOSINOPHILS 0.1 0.0 - 0.4 K/UL    ABS. BASOPHILS 0.1 0.0 - 0.1 K/UL    ABS. IMM.  GRANS. 0.0 0.00 - 0.04 K/UL    DF AUTOMATED     METABOLIC PANEL, BASIC    Collection Time: 03/16/22  9:15 PM   Result Value Ref Range    Sodium 140 136 - 145 mmol/L    Potassium 3.2 (L) 3.5 - 5.1 mmol/L    Chloride 109 (H) 97 - 108 mmol/L    CO2 26 21 - 32 mmol/L    Anion gap 5 5 - 15 mmol/L    Glucose 181 (H) 65 - 100 mg/dL    BUN 15 6 - 20 MG/DL    Creatinine 0.70 0.70 - 1.30 MG/DL    BUN/Creatinine ratio 21 (H) 12 - 20      GFR est AA >60 >60 ml/min/1.73m2    GFR est non-AA >60 >60 ml/min/1.73m2    Calcium 8.8 8.5 - 10.1 MG/DL   SED RATE (ESR)    Collection Time: 03/16/22  9:15 PM   Result Value Ref Range    Sed rate, automated 29 (H) 0 - 20 mm/hr   HEMOGLOBIN A1C WITH EAG    Collection Time: 03/16/22  9:15 PM   Result Value Ref Range    Hemoglobin A1c 7.3 (H) 4.0 - 5.6 %    Est. average glucose 163 mg/dL   GLUCOSE, POC    Collection Time: 03/17/22  2:22 AM   Result Value Ref Range    Glucose (POC) 150 (H) 65 - 117 mg/dL    Performed by Avani Sol, [address], BASIC    Collection Time: 03/17/22  3:58 AM   Result Value Ref Range    Sodium 140 136 - 145 mmol/L    Potassium 3.3 (L) 3.5 - 5.1 mmol/L    Chloride 111 (H) 97 - 108 mmol/L    CO2 27 21 - 32 mmol/L    Anion gap 2 (L) 5 - 15 mmol/L    Glucose 200 (H) 65 - 100 mg/dL    BUN 16 6 - 20 MG/DL    Creatinine 0.72 0.70 - 1.30 MG/DL    BUN/Creatinine ratio 22 (H) 12 - 20      GFR est AA >60 >60 ml/min/1.73m2    GFR est non-AA >60 >60 ml/min/1.73m2    Calcium 9.2 8.5 - 10.1 MG/DL   GLUCOSE, POC    Collection Time: 03/17/22  7:12 AM   Result Value Ref Range    Glucose (POC) 173 (H) 65 - 117 mg/dL    Performed by Nico Valladares Se     03/16/22 2115   WBC 7.0   HGB 12.0*   HCT 36.5*        Recent Labs     03/17/22  0358 03/16/22 2115    140   K 3.3* 3.2*   * 109*   CO2 27 26   BUN 16 15   CREA 0.72 0.70   * 181*   CA 9.2 8.8     No results for input(s): ALT, AP, TBIL, TBILI, TP, ALB, GLOB, GGT, AML, LPSE in the last 72 hours. No lab exists for component: SGOT, GPT, AMYP, HLPSE  No results for input(s): INR, PTP, APTT, INREXT in the last 72 hours. No results for input(s): FE, TIBC, PSAT, FERR in the last 72 hours. No results found for: FOL, RBCF   No results for input(s): PH, PCO2, PO2 in the last 72 hours. No results for input(s): CPK, CKNDX, TROIQ in the last 72 hours.     No lab exists for component: CPKMB  Lab Results   Component Value Date/Time    Cholesterol, total 185 09/22/2020 01:58 PM    HDL Cholesterol 60 09/22/2020 01:58 PM    LDL, calculated 99.4 09/22/2020 01:58 PM    Triglyceride 128 09/22/2020 01:58 PM    CHOL/HDL Ratio 3.1 09/22/2020 01:58 PM     Lab Results   Component Value Date/Time    Glucose (POC) 173 (H) 03/17/2022 07:12 AM    Glucose (POC) 150 (H) 03/17/2022 02:22 AM    Glucose (POC) 197 (H) 03/16/2022 07:13 PM     Lab Results   Component Value Date/Time    Color YELLOW/STRAW 09/22/2020 01:58 PM    Appearance CLEAR 09/22/2020 01:58 PM    Specific gravity 1.006 09/22/2020 01:58 PM    pH (UA) 6.5 09/22/2020 01:58 PM    Protein Negative 09/22/2020 01:58 PM    Glucose Negative 09/22/2020 01:58 PM    Ketone Negative 09/22/2020 01:58 PM    Bilirubin Negative 09/22/2020 01:58 PM Urobilinogen 0.2 09/22/2020 01:58 PM    Nitrites Negative 09/22/2020 01:58 PM    Leukocyte Esterase Negative 09/22/2020 01:58 PM       Med list reviewed  Current Facility-Administered Medications   Medication Dose Route Frequency    cefepime (MAXIPIME) 2 g in sterile water (preservative free) 10 mL IV syringe  2 g IntraVENous Q8H    [START ON 3/18/2022] vancomycin random level 3/18 with am labs    Other ONCE    sodium chloride (NS) flush 5-40 mL  5-40 mL IntraVENous Q8H    sodium chloride (NS) flush 5-40 mL  5-40 mL IntraVENous PRN    acetaminophen (TYLENOL) tablet 650 mg  650 mg Oral Q6H PRN    Or    acetaminophen (TYLENOL) suppository 650 mg  650 mg Rectal Q6H PRN    polyethylene glycol (MIRALAX) packet 17 g  17 g Oral DAILY PRN    ondansetron (ZOFRAN ODT) tablet 4 mg  4 mg Oral Q8H PRN    Or    ondansetron (ZOFRAN) injection 4 mg  4 mg IntraVENous Q6H PRN    0.9% sodium chloride infusion  125 mL/hr IntraVENous CONTINUOUS    atorvastatin (LIPITOR) tablet 40 mg  40 mg Oral DAILY    losartan (COZAAR) tablet 25 mg  25 mg Oral DAILY    montelukast (SINGULAIR) tablet 10 mg  10 mg Oral DAILY    pantoprazole (PROTONIX) tablet 40 mg  40 mg Oral ACB    tamsulosin (FLOMAX) capsule 0.4 mg  0.4 mg Oral DAILY    glucose chewable tablet 16 g  4 Tablet Oral PRN    dextrose 10 % infusion 0-250 mL  0-250 mL IntraVENous PRN    glucagon (GLUCAGEN) injection 1 mg  1 mg IntraMUSCular PRN    insulin glargine (LANTUS) injection 45 Units  45 Units SubCUTAneous BID    Vancomycin - pharmacy to dose   Other Rx Dosing/Monitoring    vancomycin (VANCOCIN) 1250 mg in  ml infusion  1,250 mg IntraVENous Q12H       Care Plan discussed with:  Patient/Family, Nurse and     Jamie Graff MD  Internal Medicine  Date of Service: 3/17/2022

## 2022-03-17 NOTE — PROGRESS NOTES
Pharmacist Note - Vancomycin Dosing    Consult provided for this 78 y.o. male for indication of bilateral great toe cellulitis/osteo? [place] V[place] Antibiotic regimen(s): Vanc + Zosyn  Patient on vancomycin PTA? NO     Recent Labs     22  2115   WBC 7.0   CREA 0.70   BUN 15     Frequency of BMP: daily x 3  Height: 190.5 cm  Weight: 131.5 kg  Est CrCl: 151.7 ml/min; UO: -- ml/kg/hr  Temp (24hrs), Av.4 °F (36.3 °C), Min:97.4 °F (36.3 °C), Max:97.4 °F (36.3 °C)    Cultures:  --    MRSA Swab ordered (if applicable)? N/A    The plan below is expected to result in a target range of AUC/-600    Therapy will be initiated with a loading dose of 2500 mg IV x 1 to be followed by a maintenance dose of 1250 mg IV every 12 hours. Pharmacy to follow patient daily and order levels / make dose adjustments as appropriate. *Vancomycin has been dosed used Bayesian kinetics software to target an AUC/SOY of 400-600, which provides adequate exposure for an assumed infection due to MRSA with an SOY of 1 or less while reducing the risk of nephrotoxicity as seen with traditional trough based dosing goals.

## 2022-03-17 NOTE — PROGRESS NOTES
Physical Therapy Screening:  Services are not indicated at this time. An [facility] screening referral was triggered for physical therapy based on results obtained during the nursing admission assessment. The patients chart was reviewed and the patient is not appropriate for a skilled therapy evaluation at this time. Please consult physical therapy if any therapy needs arise. Thank you.     Luis Armas, PT

## 2022-03-17 NOTE — ED NOTES
Has a final transfer review been performed? Yes    Reason for Admission: Infected Toe  Patient comes from: Ed [facility]  Mental Status: Alert and oriented  ADL:self care  Ambulation:no difficulty  Pertinent Info/Safety Concerns: none  COVID Status: Not Indicated  MEWS Score: 1  Vitals:    03/16/22 1910 03/16/22 1914 03/16/22 2023 03/17/22 0018   BP:  (!) 162/94 (!) 165/85 (!) 159/88   Pulse:  82 78 71   Resp:  16 17 19   Temp:    97.5 °F (36.4 °C)   SpO2:  93% 94% 97%   Weight: 131.5 kg (290 lb)      Height: 6' 3\" (1.905 m)        Transport mode:ED stretcher    TRANSFER - OUT REPORT:    Report given to [address] transfer on Haley Martin  being transferred to [facility]  for routine progression of care       Report consisted of patient's Situation, Background, Assessment and   Recommendations(SBAR). Information from the following report(s) SBAR, Kardex, ED Summary, Intake/Output and Quality Measures was reviewed with the receiving nurse. Lines:   Peripheral IV 03/17/22 Left Antecubital (Active)        Opportunity for questions and clarification was provided.

## 2022-03-17 NOTE — ED NOTES
Pt c/o left AC \"burning\"- 18 ga INT removed an new 20 ga placed in right hand.   Pt resting more comfortable @ this time
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
## 2022-03-17 NOTE — PROGRESS NOTES
Patient has scheduled 45 units of Lantus due at 1800; blood sugar 100 at 1630. Informed Dr. Jesse Mclean about situation over the phone. MD advised to hold 45 units of Lantus at this time.

## 2022-03-17 NOTE — PROGRESS NOTES
Problem: Diabetes Self-Management  Goal: *Disease process and treatment process  Description: Define diabetes and identify own type of diabetes; list 3 options for treating diabetes. 3/17/2022 0501 by Paula Smith RN  Outcome: Progressing Towards Goal  3/17/2022 0459 by Paula Smith RN  Outcome: Progressing Towards Goal  Goal: *Incorporating nutritional management into lifestyle  Description: Describe effect of type, amount and timing of food on blood glucose; list 3 methods for planning meals. 3/17/2022 0501 by Paula Smith RN  Outcome: Progressing Towards Goal  3/17/2022 0459 by Paula Smith RN  Outcome: Progressing Towards Goal  Goal: *Incorporating physical activity into lifestyle  Description: State effect of exercise on blood glucose levels. 3/17/2022 0501 by Paula Smith RN  Outcome: Progressing Towards Goal  3/17/2022 0459 by Paula Smith RN  Outcome: Progressing Towards Goal  Goal: *Developing strategies to promote health/change behavior  Description: Define the ABC's of diabetes; identify appropriate screenings, schedule and personal plan for screenings. 3/17/2022 0501 by Paula Smith RN  Outcome: Progressing Towards Goal  3/17/2022 0459 by Paula Smith RN  Outcome: Progressing Towards Goal  Goal: *Using medications safely  Description: State effect of diabetes medications on diabetes; name diabetes medication taking, action and side effects. 3/17/2022 0501 by Paula Smith RN  Outcome: Progressing Towards Goal  3/17/2022 0459 by Paula Smiht RN  Outcome: Progressing Towards Goal  Goal: *Monitoring blood glucose, interpreting and using results  Description: Identify recommended blood glucose targets  and personal targets.   3/17/2022 0501 by Paula Smith RN  Outcome: Progressing Towards Goal  3/17/2022 0459 by Paula Smith RN  Outcome: Progressing Towards Goal  Goal: *Prevention, detection, treatment of acute complications  Description: List symptoms of hyper- and hypoglycemia; describe how to treat low blood sugar and actions for lowering  high blood glucose level. 3/17/2022 0501 by Paula Smith RN  Outcome: Progressing Towards Goal  3/17/2022 0459 by Paula Smith RN  Outcome: Progressing Towards Goal  Goal: *Prevention, detection and treatment of chronic complications  Description: Define the natural course of diabetes and describe the relationship of blood glucose levels to long term complications of diabetes. 3/17/2022 0501 by Paula Smith RN  Outcome: Progressing Towards Goal  3/17/2022 0459 by Paula Smith RN  Outcome: Progressing Towards Goal  Goal: *Developing strategies to address psychosocial issues  Description: Describe feelings about living with diabetes; identify support needed and support network  3/17/2022 0501 by Paula Smith RN  Outcome: Progressing Towards Goal  3/17/2022 0459 by Paula Smith RN  Outcome: Progressing Towards Goal  Goal: *Insulin pump training  3/17/2022 0501 by Paula Smith RN  Outcome: Progressing Towards Goal  3/17/2022 0459 by Paula Smith RN  Outcome: Progressing Towards Goal  Goal: *Sick day guidelines  3/17/2022 0501 by Paula Smith RN  Outcome: Progressing Towards Goal  3/17/2022 0459 by Paula Smith RN  Outcome: Progressing Towards Goal  Goal: *Patient Specific Goal (EDIT GOAL, INSERT TEXT)  3/17/2022 0501 by Paula Smith RN  Outcome: Progressing Towards Goal  3/17/2022 0459 by Paula Smith RN  Outcome: Progressing Towards Goal     Problem: Patient Education: Go to Patient Education Activity  Goal: Patient/Family Education  3/17/2022 0501 by Paula Smith RN  Outcome: Progressing Towards Goal  3/17/2022 0459 by Paula Smith RN  Outcome: Progressing Towards Goal     Problem: Falls - Risk of  Goal: *Absence of Falls  Description: Document Farrahda Gamma Fall Risk and appropriate interventions in the flowsheet.   3/17/2022 0501 by Paula Smith RN  Outcome: Progressing Towards Goal  Note: Fall Risk Interventions:  Mobility Interventions: Patient to call before getting OOB                          3/17/2022 0459 by Jairon Walls RN  Outcome: Progressing Towards Goal  Note: Fall Risk Interventions:  Mobility Interventions: Patient to call before getting OOB                             Problem: Patient Education: Go to Patient Education Activity  Goal: Patient/Family Education  3/17/2022 0501 by Jairon Walls RN  Outcome: Progressing Towards Goal  3/17/2022 0459 by Jairon Walls RN  Outcome: Progressing Towards Goal     Problem: General Infection Care Plan (Adult and Pediatric)  Goal: Improvement in signs and symptoms of infection  Outcome: Progressing Towards Goal  Goal: *Optimize nutritional status  Outcome: Progressing Towards Goal     Problem: Patient Education: Go to Patient Education Activity  Goal: Patient/Family Education  Outcome: Progressing Towards Goal     Problem: Pain  Goal: *Control of Pain  Outcome: Progressing Towards Goal  Goal: *PALLIATIVE CARE:  Alleviation of Pain  Outcome: Progressing Towards Goal     Problem: Patient Education: Go to Patient Education Activity  Goal: Patient/Family Education  Outcome: Progressing Towards Goal     Problem: Lower Extremity Wound Care  Goal: *Non-infected wound: Improvement of existing wound, absence of infection, and maintenance of skin integrity  Outcome: Progressing Towards Goal  Goal: *Infected Wound: Prevention of further infection and promotion of healing  Description: Infection control procedures (eg: clean dressings, clean gloves, hand washing, precautions to isolate wound from contamination, sterile instruments used for wound debridement) should be implemented.   Outcome: Progressing Towards Goal  Goal: Interventions  Outcome: Progressing Towards Goal     Problem: Patient Education: Go to Patient Education Activity  Goal: Patient/Family Education  Outcome: Progressing Towards Goal

## 2022-03-17 NOTE — CONSULTS
Foot & Ankle Surgery    Pt seen and examined at [facility]. Full consult to follow. No gross signs of infection in both great toes. Xrays reviewed. Negative for OM. No need for surgical intervention at this time. Would DC on 10-14 days of doxycycline.     Raul Omer DPM
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
## 2022-03-18 VITALS
WEIGHT: 290 LBS | OXYGEN SATURATION: 97 % | TEMPERATURE: 98 F | DIASTOLIC BLOOD PRESSURE: 82 MMHG | BODY MASS INDEX: 36.06 KG/M2 | SYSTOLIC BLOOD PRESSURE: 138 MMHG | HEART RATE: 67 BPM | HEIGHT: 75 IN | RESPIRATION RATE: 17 BRPM

## 2022-03-18 LAB
ANION GAP SERPL CALC-SCNC: 6 MMOL/L (ref 5–15)
BACTERIA SPEC CULT: NORMAL
BACTERIA SPEC CULT: NORMAL
BUN SERPL-MCNC: 12 MG/DL (ref 6–20)
BUN/CREAT SERPL: 17 (ref 12–20)
CALCIUM SERPL-MCNC: 9.2 MG/DL (ref 8.5–10.1)
CHLORIDE SERPL-SCNC: 111 MMOL/L (ref 97–108)
CO2 SERPL-SCNC: 25 MMOL/L (ref 21–32)
CREAT SERPL-MCNC: 0.69 MG/DL (ref 0.7–1.3)
ERYTHROCYTE [DISTWIDTH] IN BLOOD BY AUTOMATED COUNT: 13 % (ref 11.5–14.5)
GLUCOSE BLD STRIP.AUTO-MCNC: 153 MG/DL (ref 65–117)
GLUCOSE BLD STRIP.AUTO-MCNC: 99 MG/DL (ref 65–117)
GLUCOSE SERPL-MCNC: 85 MG/DL (ref 65–100)
HCT VFR BLD AUTO: 35.3 % (ref 36.6–50.3)
HGB BLD-MCNC: 11.3 G/DL (ref 12.1–17)
MAGNESIUM SERPL-MCNC: 2.1 MG/DL (ref 1.6–2.4)
MCH RBC QN AUTO: 29 PG (ref 26–34)
MCHC RBC AUTO-ENTMCNC: 32 G/DL (ref 30–36.5)
MCV RBC AUTO: 90.7 FL (ref 80–99)
NRBC # BLD: 0 K/UL (ref 0–0.01)
NRBC BLD-RTO: 0 PER 100 WBC
PLATELET # BLD AUTO: 204 K/UL (ref 150–400)
PMV BLD AUTO: 11.2 FL (ref 8.9–12.9)
POTASSIUM SERPL-SCNC: 3.5 MMOL/L (ref 3.5–5.1)
RBC # BLD AUTO: 3.89 M/UL (ref 4.1–5.7)
SERVICE CMNT-IMP: ABNORMAL
SERVICE CMNT-IMP: NORMAL
SERVICE CMNT-IMP: NORMAL
SODIUM SERPL-SCNC: 142 MMOL/L (ref 136–145)
VANCOMYCIN SERPL-MCNC: 17.2 UG/ML
WBC # BLD AUTO: 6.7 K/UL (ref 4.1–11.1)

## 2022-03-18 PROCEDURE — 83735 ASSAY OF MAGNESIUM: CPT

## 2022-03-18 PROCEDURE — 74011250636 HC RX REV CODE- 250/636: Performed by: HOSPITALIST

## 2022-03-18 PROCEDURE — 74011636637 HC RX REV CODE- 636/637: Performed by: STUDENT IN AN ORGANIZED HEALTH CARE EDUCATION/TRAINING PROGRAM

## 2022-03-18 PROCEDURE — 74011000250 HC RX REV CODE- 250: Performed by: HOSPITALIST

## 2022-03-18 PROCEDURE — 74011000250 HC RX REV CODE- 250: Performed by: STUDENT IN AN ORGANIZED HEALTH CARE EDUCATION/TRAINING PROGRAM

## 2022-03-18 PROCEDURE — 74011250636 HC RX REV CODE- 250/636: Performed by: STUDENT IN AN ORGANIZED HEALTH CARE EDUCATION/TRAINING PROGRAM

## 2022-03-18 PROCEDURE — 80202 ASSAY OF VANCOMYCIN: CPT

## 2022-03-18 PROCEDURE — 80048 BASIC METABOLIC PNL TOTAL CA: CPT

## 2022-03-18 PROCEDURE — 74011250637 HC RX REV CODE- 250/637: Performed by: STUDENT IN AN ORGANIZED HEALTH CARE EDUCATION/TRAINING PROGRAM

## 2022-03-18 PROCEDURE — 85027 COMPLETE CBC AUTOMATED: CPT

## 2022-03-18 PROCEDURE — 82962 GLUCOSE BLOOD TEST: CPT

## 2022-03-18 PROCEDURE — 36415 COLL VENOUS BLD VENIPUNCTURE: CPT

## 2022-03-18 RX ORDER — LEVOFLOXACIN 750 MG/1
750 TABLET ORAL DAILY
Qty: 9 TABLET | Refills: 0 | Status: SHIPPED | OUTPATIENT
Start: 2022-03-18 | End: 2022-03-27

## 2022-03-18 RX ORDER — CLINDAMYCIN HYDROCHLORIDE 300 MG/1
300 CAPSULE ORAL 4 TIMES DAILY
Qty: 36 CAPSULE | Refills: 0 | Status: SHIPPED | OUTPATIENT
Start: 2022-03-18 | End: 2022-03-27

## 2022-03-18 RX ORDER — L.ACIDOPH/B.ANIMALIS/B.LONGUM 15B CELL
1 CAPSULE ORAL
Qty: 10 CAPSULE | Refills: 0 | Status: SHIPPED | OUTPATIENT
Start: 2022-03-18

## 2022-03-18 RX ADMIN — VANCOMYCIN HYDROCHLORIDE 1250 MG: 1.25 INJECTION, POWDER, LYOPHILIZED, FOR SOLUTION INTRAVENOUS at 11:49

## 2022-03-18 RX ADMIN — WATER 2 G: 1 INJECTION INTRAMUSCULAR; INTRAVENOUS; SUBCUTANEOUS at 09:13

## 2022-03-18 RX ADMIN — LOSARTAN POTASSIUM 25 MG: 25 TABLET, FILM COATED ORAL at 09:12

## 2022-03-18 RX ADMIN — PANTOPRAZOLE SODIUM 40 MG: 40 TABLET, DELAYED RELEASE ORAL at 07:06

## 2022-03-18 RX ADMIN — SODIUM CHLORIDE, PRESERVATIVE FREE 10 ML: 5 INJECTION INTRAVENOUS at 01:24

## 2022-03-18 RX ADMIN — WATER 2 G: 1 INJECTION INTRAMUSCULAR; INTRAVENOUS; SUBCUTANEOUS at 01:26

## 2022-03-18 RX ADMIN — MONTELUKAST 10 MG: 10 TABLET, FILM COATED ORAL at 09:12

## 2022-03-18 RX ADMIN — ACETAMINOPHEN 650 MG: 325 TABLET ORAL at 09:37

## 2022-03-18 RX ADMIN — ATORVASTATIN CALCIUM 40 MG: 40 TABLET, FILM COATED ORAL at 09:12

## 2022-03-18 RX ADMIN — INSULIN GLARGINE 45 UNITS: 100 INJECTION, SOLUTION SUBCUTANEOUS at 09:12

## 2022-03-18 RX ADMIN — TAMSULOSIN HYDROCHLORIDE 0.4 MG: 0.4 CAPSULE ORAL at 09:12

## 2022-03-18 RX ADMIN — SODIUM CHLORIDE, PRESERVATIVE FREE 10 ML: 5 INJECTION INTRAVENOUS at 13:09

## 2022-03-18 NOTE — DISCHARGE SUMMARY
Discharge Summary     Patient: Trever Martinez MRN: [id number]  SSN: [id number]    YOB: 1955  Age: 78 y.o. Sex: male       Admit Date: 3/16/2022    Discharge Date: 3/18/2022      Admission Diagnoses: Osteomyelitis [facility]) [M86.9]    Discharge Diagnoses:   Diabetic foot infection  Type 2 DM  BPH  HTN  Dyslipidemia  Hypokalemia  PRIYANK     Discharge Condition: Stable    Hospital Course: 78 y.o man w/ DM, HTN, dyslipidemia, who presented with foot wounds. He was found to have bilateral great toe cellulitis and possible osteomyelitis. B/l great toe cellulitis and possible great toe OM:  -no s/s of systemic infection. Blood cultures not drawn on admission. -MRI of the b/l feet showed possible OM of the left great toe. Podiatry consulted and did not feel that this was OM. I discussed w/ Dr. Hui Orozco today and he does not think the patient has OM. The patient was discharged on clindamycin and levofloxacin (strep, MRSA, GNR, anaerobic coverage).    Type 2 DM    BPH    HTN     Dyslipidemia     PRIYANK     Hypokalemia: replenished    Consults: podiatry    Significant Diagnostic Studies: see above    XR GREAT TOE LT MIN 2 V    Result Date: 3/16/2022  No definite acute osteomyelitis is confirmed. Mild soft tissue swelling with cortical regularity at the medial aspect of the great toe proximal phalanx. .    XR GREAT TOE RT MIN 2 V    Result Date: 3/16/2022  Correlate with location of diabetic ulcer for possible early cortical destruction at the dorsum of the great toe distal phalanx. Sherryle Moder MRI FOOT RT W WO CONT    Result Date: 3/18/2022  1. No MR imaging evidence for osteomyelitis. 2. Moderate osteoarthrosis of hallux interphalangeal joint. MRI FOOT LT W WO CONT    Result Date: 3/18/2022  1. Osseous findings in neck and tuft of distal phalanx of hallux concerning for osteomyelitis.  2. Subtle cutaneous artifact at the plantar aspect of the hallux, at the level of the mid proximal phalanx, possible retained foreign body versus artifact extrinsic to the skin. Disposition: home    S: no acute complaints, no fevers/chills n/v/d, dyspnea, pain    O:   Visit Vitals  /74 (BP 1 Location: Left upper arm, BP Patient Position: At rest)   Pulse 64   Temp 98.7 °F (37.1 °C)   Resp 17   Ht 6' 3\" (1.905 m)   Wt 131.5 kg (290 lb)   SpO2 98%   BMI 36.25 kg/m²     NAD  RRR  Lungs CTAB  Extr b/l great toe discoloration      Discharge Medications:   Current Discharge Medication List      START taking these medications    Details   clindamycin (CLEOCIN) 300 mg capsule Take 1 Capsule by mouth four (4) times daily for 9 days. Qty: 36 Capsule, Refills: 0      levoFLOXacin (LEVAQUIN) 750 mg tablet Take 1 Tablet by mouth daily for 9 days. Qty: 9 Tablet, Refills: 0      l.acidoph-B.lactis-B.longum (Florajen Digestion) 15 billion cell cap cap Take 1 Capsule by mouth Daily (before breakfast). Qty: 10 Capsule, Refills: 0         CONTINUE these medications which have NOT CHANGED    Details   EPINEPHrine (EPIPEN) 0.3 mg/0.3 mL injection       hydrocortisone (HYTONE) 2.5 % ointment       NovoLOG Flexpen U-100 Insulin 100 unit/mL (3 mL) inpn INJECT 25 UNITS SUBCUTANEOUSLY THREE TIMES DAILY BEFORE MEAL(S) FOR 90 DAYS      sildenafil citrate (VIAGRA) 100 mg tablet Take 100 mg by mouth as needed for Erectile Dysfunction. atorvastatin (LIPITOR) 40 mg tablet Take 40 mg by mouth daily. aspirin delayed-release 81 mg tablet Take 81 mg by mouth daily. omeprazole (PRILOSEC) 20 mg capsule Take 20 mg by mouth daily. losartan (COZAAR) 25 mg tablet Take 25 mg by mouth daily. cyclobenzaprine (FLEXERIL) 10 mg tablet Take 10 mg by mouth nightly as needed for Muscle Spasm(s). Indications: muscle spasm      loratadine (CLARITIN) 10 mg tablet Take 10 mg by mouth daily. montelukast (SINGULAIR) 10 mg tablet Take 10 mg by mouth daily.       insulin lispro (HUMALOG U-100 INSULIN) 100 unit/mL injection 30 Units by SubCUTAneous route Before breakfast, lunch, and dinner. Indications: type 2 diabetes mellitus      LANTUS SOLOSTAR U-100 INSULIN 100 unit/mL (3 mL) inpn 45 Units by SubCUTAneous route two (2) times a day. Qty: 30 mL, Refills: 1      albuterol (PROVENTIL HFA, VENTOLIN HFA, PROAIR HFA) 90 mcg/actuation inhaler Take 2 Puffs by inhalation every six (6) hours as needed for Shortness of Breath. acyclovir (ZOVIRAX) 400 mg tablet Take 400 mg by mouth two (2) times daily as needed. tamsulosin (FLOMAX) 0.4 mg capsule Take 0.4 mg by mouth daily. Follow-up Information     Follow up With Specialties Details Why Contact Info        follow-up with your primary care physician at [facility] within 1 week    Karen Melo DPM Foot and Ankle Surgery Schedule an appointment as soon as possible for a visit in 2 weeks  [address]  [phone]            Signed By: Goldie Esquivel MD     March 18, 2022      Greater than 30 minutes spent on discharge management.

## 2022-03-18 NOTE — PROGRESS NOTES
Problem: Diabetes Self-Management  Goal: *Disease process and treatment process  Description: Define diabetes and identify own type of diabetes; list 3 options for treating diabetes. Outcome: Progressing Towards Goal  Goal: *Incorporating nutritional management into lifestyle  Description: Describe effect of type, amount and timing of food on blood glucose; list 3 methods for planning meals. Outcome: Progressing Towards Goal  Goal: *Incorporating physical activity into lifestyle  Description: State effect of exercise on blood glucose levels. Outcome: Progressing Towards Goal  Goal: *Developing strategies to promote health/change behavior  Description: Define the ABC's of diabetes; identify appropriate screenings, schedule and personal plan for screenings. Outcome: Progressing Towards Goal  Goal: *Using medications safely  Description: State effect of diabetes medications on diabetes; name diabetes medication taking, action and side effects. Outcome: Progressing Towards Goal  Goal: *Monitoring blood glucose, interpreting and using results  Description: Identify recommended blood glucose targets  and personal targets. Outcome: Progressing Towards Goal  Goal: *Prevention, detection, treatment of acute complications  Description: List symptoms of hyper- and hypoglycemia; describe how to treat low blood sugar and actions for lowering  high blood glucose level. Outcome: Progressing Towards Goal  Goal: *Prevention, detection and treatment of chronic complications  Description: Define the natural course of diabetes and describe the relationship of blood glucose levels to long term complications of diabetes.   Outcome: Progressing Towards Goal  Goal: *Developing strategies to address psychosocial issues  Description: Describe feelings about living with diabetes; identify support needed and support network  Outcome: Progressing Towards Goal  Goal: *Insulin pump training  Outcome: Progressing Towards Goal  Goal: *Sick day guidelines  Outcome: Progressing Towards Goal  Goal: *Patient Specific Goal (EDIT GOAL, INSERT TEXT)  Outcome: Progressing Towards Goal     Problem: Patient Education: Go to Patient Education Activity  Goal: Patient/Family Education  Outcome: Progressing Towards Goal     Problem: Falls - Risk of  Goal: *Absence of Falls  Description: Document Durham Blades Fall Risk and appropriate interventions in the flowsheet. Outcome: Progressing Towards Goal  Note: Fall Risk Interventions:  Mobility Interventions: Communicate number of staff needed for ambulation/transfer,Patient to call before getting OOB                             Problem: Patient Education: Go to Patient Education Activity  Goal: Patient/Family Education  Outcome: Progressing Towards Goal     Problem: General Infection Care Plan (Adult and Pediatric)  Goal: Improvement in signs and symptoms of infection  Outcome: Progressing Towards Goal  Goal: *Optimize nutritional status  Outcome: Progressing Towards Goal     Problem: Patient Education: Go to Patient Education Activity  Goal: Patient/Family Education  Outcome: Progressing Towards Goal     Problem: Pain  Goal: *Control of Pain  Outcome: Progressing Towards Goal  Goal: *PALLIATIVE CARE:  Alleviation of Pain  Outcome: Progressing Towards Goal     Problem: Patient Education: Go to Patient Education Activity  Goal: Patient/Family Education  Outcome: Progressing Towards Goal     Problem: Lower Extremity Wound Care  Goal: *Non-infected wound: Improvement of existing wound, absence of infection, and maintenance of skin integrity  Outcome: Progressing Towards Goal  Goal: *Infected Wound: Prevention of further infection and promotion of healing  Description: Infection control procedures (eg: clean dressings, clean gloves, hand washing, precautions to isolate wound from contamination, sterile instruments used for wound debridement) should be implemented.   Outcome: Progressing Towards Goal  Goal: Interventions  Outcome: Progressing Towards Goal     Problem: Patient Education: Go to Patient Education Activity  Goal: Patient/Family Education  Outcome: Progressing Towards Goal     Problem: Risk for Spread of Infection  Goal: Prevent transmission of infectious organism to others  Description: Prevent the transmission of infectious organisms to other patients, staff members, and visitors.   Outcome: Progressing Towards Goal     Problem: Patient Education:  Go to Education Activity  Goal: Patient/Family Education  Outcome: Progressing Towards Goal

## 2022-03-18 NOTE — PROGRESS NOTES
ROSELYN:  RUR: 6%  Disposition: Overflow Shelter    CM spoke with patient and advised of plans for d/c. CM asked patient to provide updates re his previous shelter housing. Patient voiced complaints re meals received at [facility] as he gets meals not dietary compliant. He states he is diabetic and receives \"lots of rice, noodles and food with high carbs. He states he is diabetic. He is not allowed to go out to get food. He states he has been at this shelter since 12 23/22. He states he has a bed at the shelter until 3/28/22 and then would have to seek alternative housing. He has has prior stay at [facility] (30 day stay) paid by [facility]. The patient works at [employer]  to [facility] called Ms Malinda Park at [facility] ([phone]). Ms Malinda Park states patient did not notify staff of his drive to the hospital and his overnight stay. As a result, he was not in compliance with facility policy. He has lost his bed and his belongings have been packed. The patient is welcome to come to the facility to  his belongings. Ms. Malinda Park states he can drive himself to The [address] at [facility]. The patient will have to start the shelter housing process over again. The patient will have to be at The [address] before 7pm    Patient informed of update. There are no further needs identified at this time.     Sagar Yip, [address]

## 2022-03-18 NOTE — PROGRESS NOTES
Problem: Diabetes Self-Management  Goal: *Disease process and treatment process  Description: Define diabetes and identify own type of diabetes; list 3 options for treating diabetes. Outcome: Progressing Towards Goal  Goal: *Incorporating nutritional management into lifestyle  Description: Describe effect of type, amount and timing of food on blood glucose; list 3 methods for planning meals. Outcome: Progressing Towards Goal  Goal: *Incorporating physical activity into lifestyle  Description: State effect of exercise on blood glucose levels. Outcome: Progressing Towards Goal  Goal: *Developing strategies to promote health/change behavior  Description: Define the ABC's of diabetes; identify appropriate screenings, schedule and personal plan for screenings. Outcome: Progressing Towards Goal  Goal: *Using medications safely  Description: State effect of diabetes medications on diabetes; name diabetes medication taking, action and side effects. Outcome: Progressing Towards Goal  Goal: *Monitoring blood glucose, interpreting and using results  Description: Identify recommended blood glucose targets  and personal targets. Outcome: Progressing Towards Goal  Goal: *Prevention, detection, treatment of acute complications  Description: List symptoms of hyper- and hypoglycemia; describe how to treat low blood sugar and actions for lowering  high blood glucose level. Outcome: Progressing Towards Goal  Goal: *Prevention, detection and treatment of chronic complications  Description: Define the natural course of diabetes and describe the relationship of blood glucose levels to long term complications of diabetes.   Outcome: Progressing Towards Goal  Goal: *Developing strategies to address psychosocial issues  Description: Describe feelings about living with diabetes; identify support needed and support network  Outcome: Progressing Towards Goal  Goal: *Insulin pump training  Outcome: Progressing Towards Goal  Goal: *Sick day guidelines  Outcome: Progressing Towards Goal  Goal: *Patient Specific Goal (EDIT GOAL, INSERT TEXT)  Outcome: Progressing Towards Goal     Problem: Patient Education: Go to Patient Education Activity  Goal: Patient/Family Education  Outcome: Progressing Towards Goal     Problem: Falls - Risk of  Goal: *Absence of Falls  Description: Document Dennie Oak Fall Risk and appropriate interventions in the flowsheet. Outcome: Progressing Towards Goal  Note: Fall Risk Interventions:  Mobility Interventions: Patient to call before getting OOB                             Problem: Patient Education: Go to Patient Education Activity  Goal: Patient/Family Education  Outcome: Progressing Towards Goal     Problem: General Infection Care Plan (Adult and Pediatric)  Goal: Improvement in signs and symptoms of infection  Outcome: Progressing Towards Goal  Goal: *Optimize nutritional status  Outcome: Progressing Towards Goal     Problem: Patient Education: Go to Patient Education Activity  Goal: Patient/Family Education  Outcome: Progressing Towards Goal     Problem: Pain  Goal: *Control of Pain  Outcome: Progressing Towards Goal  Goal: *PALLIATIVE CARE:  Alleviation of Pain  Outcome: Progressing Towards Goal     Problem: Patient Education: Go to Patient Education Activity  Goal: Patient/Family Education  Outcome: Progressing Towards Goal     Problem: Lower Extremity Wound Care  Goal: *Non-infected wound: Improvement of existing wound, absence of infection, and maintenance of skin integrity  Outcome: Progressing Towards Goal  Goal: *Infected Wound: Prevention of further infection and promotion of healing  Description: Infection control procedures (eg: clean dressings, clean gloves, hand washing, precautions to isolate wound from contamination, sterile instruments used for wound debridement) should be implemented.   Outcome: Progressing Towards Goal  Goal: Interventions  Outcome: Progressing Towards Goal     Problem: Patient Education: Go to Patient Education Activity  Goal: Patient/Family Education  Outcome: Progressing Towards Goal     Problem: Risk for Spread of Infection  Goal: Prevent transmission of infectious organism to others  Description: Prevent the transmission of infectious organisms to other patients, staff members, and visitors.   Outcome: Progressing Towards Goal     Problem: Patient Education:  Go to Education Activity  Goal: Patient/Family Education  Outcome: Progressing Towards Goal
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
## 2022-03-18 NOTE — PROGRESS NOTES
Pharmacist Note - Vancomycin Dosing  Therapy day 2  Indication: bilateral great toe cellulitis + osteomyelitis of left toe   Current regimen: 1250 mg IV Q12H    Recent Labs     03/18/22  0417 03/17/22  0358 03/16/22  2115   WBC 6.7  --  7.0   CREA 0.69* 0.72 0.70   BUN 12 16 15       A random vancomycin level of 17.2 mcg/mL was obtained and from this level, the patient's AUC24 is calculated to be 439 with the current regimen. Goal target range AUC/-600      Plan: Continue current regimen. Pharmacy will continue to monitor this patient daily for changes in clinical status and renal function. *Random vancomycin levels are used to calculate AUC/SOY, this level should not be interpreted as a trough. Vancomycin has been dosed using Bayesian kinetics software to target an AUC24:SOY of 400-600, which provides adequate exposure for as assumed infection due to MRSA with an SOY of 1 or less while reducing the risk of nephrotoxicity as seen with traditional trough based dosing goals.

## 2022-03-18 NOTE — PROGRESS NOTES
I have reviewed discharge instructions with the patient, including picking up (3) new prescriptions, scheduling f/u appointment with PCP and Podiatrist as per DC orders.    The patient verbalized understanding of all DC instructions

## 2022-04-04 NOTE — PROGRESS NOTES
Physician Progress Note      PATIENT:               Marcio Choi  CSN #:                  [id number]  :                       1955  ADMIT DATE:       3/16/2022 7:03 PM  Sam Caputo DATE:        3/18/2022 7:19 PM  RESPONDING  PROVIDER #:        Taurus Tai MD          QUERY TEXT:    Pt admitted with Bilateral Great Toe cellulitis. Pt noted to have DM 2, with A1c 7.3. If possible, please document in progress notes and discharge summary the relationship, if any, between cellulitis and DM. The medical record reflects the following:  Risk Factors: hx of DM2 with elevated A1C  Clinical Indicators: admitted with swelling and increased pain in feet, noted to have bilateral great toe cellulitis. Gluc 197 on admission, with A1C 7.3. Treatment: Maxipime/ Vanc/ Zosyn IV, Podiatry consult, monitoring of labs    Thank you,  Kelly Moncada RN  Clinical Documentation  [phone]  Options provided:  -- Bilateral Great Toe cellulitis associated with Diabetes  -- Bilateral Great Toe cellulitis unrelated to Diabetes  -- Other - I will add my own diagnosis  -- Disagree - Not applicable / Not valid  -- Disagree - Clinically unable to determine / Unknown  -- Refer to Clinical Documentation Reviewer    PROVIDER RESPONSE TEXT:    Bilateral Great Toe cellulitis associated with Diabetes.     Query created by: Kierra Vega on 3/18/2022 3:45 PM      Electronically signed by:  Taurus Tai MD 2022 1:41 PM

## 2022-07-06 ENCOUNTER — TRANSCRIBE ORDER (OUTPATIENT)
Dept: SCHEDULING | Age: 67
End: 2022-07-06

## 2022-07-06 DIAGNOSIS — H81.90 EPISODIC RECURRENT VERTIGO: Primary | ICD-10-CM

## 2022-07-27 ENCOUNTER — HOSPITAL ENCOUNTER (OUTPATIENT)
Dept: MRI IMAGING | Age: 67
Discharge: HOME | End: 2022-07-27
Attending: STUDENT IN AN ORGANIZED HEALTH CARE EDUCATION/TRAINING PROGRAM
Payer: MEDICARE

## 2022-07-27 DIAGNOSIS — H81.90 EPISODIC RECURRENT VERTIGO: ICD-10-CM

## 2022-07-27 PROCEDURE — 70553 MRI BRAIN STEM W/O & W/DYE: CPT

## 2022-07-27 PROCEDURE — 74011250636 HC RX REV CODE- 250/636

## 2022-07-27 PROCEDURE — A9575 INJ GADOTERATE MEGLUMI 0.1ML: HCPCS

## 2022-07-27 RX ORDER — GADOTERATE MEGLUMINE 376.9 MG/ML
20 INJECTION INTRAVENOUS
Status: COMPLETED | OUTPATIENT
Start: 2022-07-27 | End: 2022-07-27

## 2022-07-27 RX ADMIN — GADOTERATE MEGLUMINE 20 ML: 376.9 INJECTION INTRAVENOUS at 15:56
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
